# Patient Record
Sex: FEMALE | Race: WHITE | NOT HISPANIC OR LATINO | Employment: OTHER | URBAN - METROPOLITAN AREA
[De-identification: names, ages, dates, MRNs, and addresses within clinical notes are randomized per-mention and may not be internally consistent; named-entity substitution may affect disease eponyms.]

---

## 2017-12-18 ENCOUNTER — LAB (OUTPATIENT)
Dept: LAB | Facility: HOSPITAL | Age: 72
End: 2017-12-18

## 2017-12-18 ENCOUNTER — TRANSCRIBE ORDERS (OUTPATIENT)
Dept: LAB | Facility: HOSPITAL | Age: 72
End: 2017-12-18

## 2017-12-18 DIAGNOSIS — I15.9 SECONDARY HYPERTENSION: ICD-10-CM

## 2017-12-18 DIAGNOSIS — I15.9 SECONDARY HYPERTENSION: Primary | ICD-10-CM

## 2017-12-18 PROCEDURE — 84244 ASSAY OF RENIN: CPT

## 2017-12-18 PROCEDURE — 82088 ASSAY OF ALDOSTERONE: CPT

## 2017-12-18 PROCEDURE — 36415 COLL VENOUS BLD VENIPUNCTURE: CPT

## 2017-12-22 LAB — ALDOST SERPL-MCNC: 1.8 NG/DL (ref 0–30)

## 2017-12-26 LAB — RENIN PLAS-CCNC: 0.4 NG/ML/HR (ref 0.17–5.38)

## 2018-03-29 ENCOUNTER — TRANSCRIBE ORDERS (OUTPATIENT)
Dept: NUTRITION | Facility: HOSPITAL | Age: 73
End: 2018-03-29

## 2018-03-29 DIAGNOSIS — K50.10 CROHN'S DISEASE OF LARGE INTESTINE WITHOUT COMPLICATION (HCC): ICD-10-CM

## 2018-03-29 DIAGNOSIS — K59.1 FUNCTIONAL DIARRHEA: Primary | ICD-10-CM

## 2018-05-01 ENCOUNTER — HOSPITAL ENCOUNTER (OUTPATIENT)
Dept: NUTRITION | Facility: HOSPITAL | Age: 73
Setting detail: RECURRING SERIES
End: 2018-05-01

## 2018-07-12 ENCOUNTER — HOSPITAL ENCOUNTER (EMERGENCY)
Facility: HOSPITAL | Age: 73
Discharge: HOME | End: 2018-07-12
Attending: EMERGENCY MEDICINE | Admitting: EMERGENCY MEDICINE

## 2018-07-12 ENCOUNTER — APPOINTMENT (OUTPATIENT)
Dept: CT IMAGING | Facility: HOSPITAL | Age: 73
End: 2018-07-12

## 2018-07-12 ENCOUNTER — APPOINTMENT (OUTPATIENT)
Dept: GENERAL RADIOLOGY | Facility: HOSPITAL | Age: 73
End: 2018-07-12

## 2018-07-12 VITALS
HEART RATE: 77 BPM | OXYGEN SATURATION: 97 % | BODY MASS INDEX: 27.46 KG/M2 | HEIGHT: 63 IN | TEMPERATURE: 98.6 F | WEIGHT: 155 LBS | RESPIRATION RATE: 20 BRPM | DIASTOLIC BLOOD PRESSURE: 83 MMHG | SYSTOLIC BLOOD PRESSURE: 153 MMHG

## 2018-07-12 DIAGNOSIS — Z86.79 HISTORY OF HYPERTENSION: ICD-10-CM

## 2018-07-12 DIAGNOSIS — Z86.39 HISTORY OF DIABETES MELLITUS, TYPE II: ICD-10-CM

## 2018-07-12 DIAGNOSIS — R74.8 ELEVATED LIPASE: ICD-10-CM

## 2018-07-12 DIAGNOSIS — K74.60 CIRRHOSIS OF LIVER WITHOUT ASCITES, UNSPECIFIED HEPATIC CIRRHOSIS TYPE (HCC): Primary | ICD-10-CM

## 2018-07-12 LAB
ALBUMIN SERPL-MCNC: 4.25 G/DL (ref 3.2–4.8)
ALBUMIN/GLOB SERPL: 1.3 G/DL (ref 1.5–2.5)
ALP SERPL-CCNC: 50 U/L (ref 25–100)
ALT SERPL W P-5'-P-CCNC: 18 U/L (ref 7–40)
ANION GAP SERPL CALCULATED.3IONS-SCNC: 8 MMOL/L (ref 3–11)
AST SERPL-CCNC: 25 U/L (ref 0–33)
BASOPHILS # BLD AUTO: 0.01 10*3/MM3 (ref 0–0.2)
BASOPHILS NFR BLD AUTO: 0.2 % (ref 0–1)
BILIRUB SERPL-MCNC: 1.4 MG/DL (ref 0.3–1.2)
BNP SERPL-MCNC: 28 PG/ML (ref 0–100)
BUN BLD-MCNC: 14 MG/DL (ref 9–23)
BUN/CREAT SERPL: 12.4 (ref 7–25)
CALCIUM SPEC-SCNC: 9.1 MG/DL (ref 8.7–10.4)
CHLORIDE SERPL-SCNC: 108 MMOL/L (ref 99–109)
CO2 SERPL-SCNC: 20 MMOL/L (ref 20–31)
CREAT BLD-MCNC: 1.13 MG/DL (ref 0.6–1.3)
DEPRECATED RDW RBC AUTO: 40.2 FL (ref 37–54)
EOSINOPHIL # BLD AUTO: 0.11 10*3/MM3 (ref 0–0.3)
EOSINOPHIL NFR BLD AUTO: 1.8 % (ref 0–3)
ERYTHROCYTE [DISTWIDTH] IN BLOOD BY AUTOMATED COUNT: 14 % (ref 11.3–14.5)
GFR SERPL CREATININE-BSD FRML MDRD: 47 ML/MIN/1.73
GLOBULIN UR ELPH-MCNC: 3.2 GM/DL
GLUCOSE BLD-MCNC: 269 MG/DL (ref 70–100)
HCT VFR BLD AUTO: 32.1 % (ref 34.5–44)
HGB BLD-MCNC: 10.7 G/DL (ref 11.5–15.5)
HOLD SPECIMEN: NORMAL
HOLD SPECIMEN: NORMAL
IMM GRANULOCYTES # BLD: 0.01 10*3/MM3 (ref 0–0.03)
IMM GRANULOCYTES NFR BLD: 0.2 % (ref 0–0.6)
LIPASE SERPL-CCNC: 136 U/L (ref 6–51)
LYMPHOCYTES # BLD AUTO: 1.58 10*3/MM3 (ref 0.6–4.8)
LYMPHOCYTES NFR BLD AUTO: 26.1 % (ref 24–44)
MCH RBC QN AUTO: 26.5 PG (ref 27–31)
MCHC RBC AUTO-ENTMCNC: 33.3 G/DL (ref 32–36)
MCV RBC AUTO: 79.5 FL (ref 80–99)
MONOCYTES # BLD AUTO: 0.31 10*3/MM3 (ref 0–1)
MONOCYTES NFR BLD AUTO: 5.1 % (ref 0–12)
NEUTROPHILS # BLD AUTO: 4.04 10*3/MM3 (ref 1.5–8.3)
NEUTROPHILS NFR BLD AUTO: 66.8 % (ref 41–71)
PLATELET # BLD AUTO: 119 10*3/MM3 (ref 150–450)
PMV BLD AUTO: 11 FL (ref 6–12)
POTASSIUM BLD-SCNC: 3.7 MMOL/L (ref 3.5–5.5)
PROT SERPL-MCNC: 7.4 G/DL (ref 5.7–8.2)
RBC # BLD AUTO: 4.04 10*6/MM3 (ref 3.89–5.14)
SODIUM BLD-SCNC: 136 MMOL/L (ref 132–146)
TROPONIN I SERPL-MCNC: 0 NG/ML (ref 0–0.07)
WBC NRBC COR # BLD: 6.05 10*3/MM3 (ref 3.5–10.8)
WHOLE BLOOD HOLD SPECIMEN: NORMAL
WHOLE BLOOD HOLD SPECIMEN: NORMAL

## 2018-07-12 PROCEDURE — 99285 EMERGENCY DEPT VISIT HI MDM: CPT

## 2018-07-12 PROCEDURE — 83880 ASSAY OF NATRIURETIC PEPTIDE: CPT

## 2018-07-12 PROCEDURE — 83690 ASSAY OF LIPASE: CPT

## 2018-07-12 PROCEDURE — 93005 ELECTROCARDIOGRAM TRACING: CPT | Performed by: EMERGENCY MEDICINE

## 2018-07-12 PROCEDURE — 25010000002 IOPAMIDOL 61 % SOLUTION: Performed by: EMERGENCY MEDICINE

## 2018-07-12 PROCEDURE — 74177 CT ABD & PELVIS W/CONTRAST: CPT

## 2018-07-12 PROCEDURE — 96374 THER/PROPH/DIAG INJ IV PUSH: CPT

## 2018-07-12 PROCEDURE — 80053 COMPREHEN METABOLIC PANEL: CPT

## 2018-07-12 PROCEDURE — 96375 TX/PRO/DX INJ NEW DRUG ADDON: CPT

## 2018-07-12 PROCEDURE — 96376 TX/PRO/DX INJ SAME DRUG ADON: CPT

## 2018-07-12 PROCEDURE — 25010000002 ONDANSETRON PER 1 MG: Performed by: EMERGENCY MEDICINE

## 2018-07-12 PROCEDURE — 96361 HYDRATE IV INFUSION ADD-ON: CPT

## 2018-07-12 PROCEDURE — 25010000002 HYDROMORPHONE PER 4 MG: Performed by: EMERGENCY MEDICINE

## 2018-07-12 PROCEDURE — 71045 X-RAY EXAM CHEST 1 VIEW: CPT

## 2018-07-12 PROCEDURE — 84484 ASSAY OF TROPONIN QUANT: CPT

## 2018-07-12 PROCEDURE — 85025 COMPLETE CBC W/AUTO DIFF WBC: CPT

## 2018-07-12 PROCEDURE — 93005 ELECTROCARDIOGRAM TRACING: CPT

## 2018-07-12 RX ORDER — GABAPENTIN 300 MG/1
300 CAPSULE ORAL 3 TIMES DAILY
COMMUNITY

## 2018-07-12 RX ORDER — SODIUM CHLORIDE 0.9 % (FLUSH) 0.9 %
10 SYRINGE (ML) INJECTION AS NEEDED
Status: DISCONTINUED | OUTPATIENT
Start: 2018-07-12 | End: 2018-07-13 | Stop reason: HOSPADM

## 2018-07-12 RX ORDER — ONDANSETRON 4 MG/1
4 TABLET, ORALLY DISINTEGRATING ORAL EVERY 8 HOURS PRN
Qty: 14 TABLET | Refills: 0 | Status: SHIPPED | OUTPATIENT
Start: 2018-07-12 | End: 2018-11-14 | Stop reason: HOSPADM

## 2018-07-12 RX ORDER — SPIRONOLACTONE 25 MG/1
25 TABLET ORAL DAILY
COMMUNITY
End: 2019-01-17

## 2018-07-12 RX ORDER — ASPIRIN 81 MG/1
324 TABLET, CHEWABLE ORAL ONCE
Status: COMPLETED | OUTPATIENT
Start: 2018-07-12 | End: 2018-07-12

## 2018-07-12 RX ORDER — ESOMEPRAZOLE MAGNESIUM 40 MG/1
40 CAPSULE, DELAYED RELEASE ORAL DAILY
COMMUNITY
End: 2023-04-05

## 2018-07-12 RX ORDER — ONDANSETRON 2 MG/ML
4 INJECTION INTRAMUSCULAR; INTRAVENOUS ONCE
Status: COMPLETED | OUTPATIENT
Start: 2018-07-12 | End: 2018-07-12

## 2018-07-12 RX ORDER — HYDROCODONE BITARTRATE AND ACETAMINOPHEN 5; 325 MG/1; MG/1
1 TABLET ORAL EVERY 6 HOURS PRN
Qty: 12 TABLET | Refills: 0 | Status: SHIPPED | OUTPATIENT
Start: 2018-07-12 | End: 2018-08-15 | Stop reason: HOSPADM

## 2018-07-12 RX ORDER — LEVOTHYROXINE SODIUM 0.07 MG/1
88 TABLET ORAL DAILY
COMMUNITY
End: 2020-10-14

## 2018-07-12 RX ORDER — PRAMIPEXOLE DIHYDROCHLORIDE 0.5 MG/1
0.5 TABLET ORAL DAILY
COMMUNITY
End: 2018-08-15 | Stop reason: HOSPADM

## 2018-07-12 RX ORDER — POTASSIUM CHLORIDE 20 MEQ/1
20 TABLET, EXTENDED RELEASE ORAL 2 TIMES DAILY
COMMUNITY
End: 2018-11-14 | Stop reason: ALTCHOICE

## 2018-07-12 RX ADMIN — SODIUM CHLORIDE 1000 ML: 9 INJECTION, SOLUTION INTRAVENOUS at 17:39

## 2018-07-12 RX ADMIN — ONDANSETRON 4 MG: 2 INJECTION, SOLUTION INTRAMUSCULAR; INTRAVENOUS at 17:40

## 2018-07-12 RX ADMIN — HYDROMORPHONE HYDROCHLORIDE 0.5 MG: 1 INJECTION, SOLUTION INTRAMUSCULAR; INTRAVENOUS; SUBCUTANEOUS at 22:18

## 2018-07-12 RX ADMIN — ASPIRIN 81 MG 324 MG: 81 TABLET ORAL at 17:36

## 2018-07-12 RX ADMIN — IOPAMIDOL 95 ML: 612 INJECTION, SOLUTION INTRAVENOUS at 19:11

## 2018-07-12 NOTE — ED PROVIDER NOTES
Subjective   Yenny Atkins is a 72 y.o.female who presents to the ED with complaints of worsening chest pain for the past ten days. The patient complains of central chest pain, which radiates into the epigastric region and back. She states the pain comes and goes, but has been worsening. She also states the pain is worsened by food intake, and so, she has not been eating during the past ten days. However, she notes a normal intake of fluids. She also complains of associated nausea and generalized weakness. Ms. Atkins denies any vomiting, shortness of breath, or any other acute complaints at this time. The patient states that she went to the [facility] on 7/6 for the same presenting symptoms. She had blood work done, which showed that her kidney, pancreas, and blood sugar levels were elevated. She also had a CT of her abdomen and pelvis with contrast, which showed no explanation for her current symptoms. The patient reports she was prescribed Zantac and was referred to a GI doctor for further evaluation. The patient has past medical history of diabetes mellitus. She has past surgical history of colon resection.        History provided by:  Patient  Chest Pain   Chest pain location: central.  Pain radiates to:  Epigastrium (back)  Pain severity:  Moderate  Onset quality:  Gradual  Duration:  10 days  Timing:  Intermittent  Progression:  Worsening  Chronicity:  New  Context: eating    Relieved by:  None tried  Exacerbated by: eating.  Ineffective treatments: Zantac.  Associated symptoms: abdominal pain (epigastric pain), back pain (secondary to chest pain), nausea and weakness (generalized)    Associated symptoms: no shortness of breath and no vomiting    Risk factors: diabetes mellitus        Review of Systems   Constitutional: Positive for appetite change (decreased food intake).   Respiratory: Negative for shortness of breath.    Cardiovascular: Positive for chest pain.   Gastrointestinal: Positive for  abdominal pain (epigastric pain) and nausea. Negative for vomiting.   Musculoskeletal: Positive for back pain (secondary to chest pain).   Neurological: Positive for weakness (generalized).   All other systems reviewed and are negative.      Past Medical History:   Diagnosis Date   • Hyperglycemia    • Hypertension    • Hypothyroidism    • Restless legs        Allergies   Allergen Reactions   • Phenergan [Promethazine Hcl] Mental Status Change       Past Surgical History:   Procedure Laterality Date   • COLON RESECTION     • KNEE SURGERY Right     TWICE       History reviewed. No pertinent family history.    Social History     Social History   • Marital status:      Social History Main Topics   • Smoking status: Never Smoker   • Alcohol use No   • Drug use: No   • Sexual activity: Defer     Other Topics Concern   • Not on file         Objective   Physical Exam   Constitutional: She is oriented to person, place, and time. She appears well-developed and well-nourished. No distress.   HENT:   Head: Normocephalic and atraumatic.   Eyes: Conjunctivae are normal. No scleral icterus.   Neck: Normal range of motion. Neck supple.   Cardiovascular: Normal rate, regular rhythm and normal heart sounds.    No murmur heard.  Pulmonary/Chest: Effort normal and breath sounds normal. No respiratory distress.   Abdominal: Soft. Bowel sounds are normal. There is tenderness. There is no guarding.   Mild tenderness in the epigastrium   Musculoskeletal: Normal range of motion. She exhibits no tenderness.   Neurological: She is alert and oriented to person, place, and time.   Skin: Skin is warm and dry. She is not diaphoretic.   Psychiatric: She has a normal mood and affect. Her behavior is normal.   Nursing note and vitals reviewed.      Procedures         ED Course  Recent Results (from the past 24 hour(s))   Comprehensive Metabolic Panel    Collection Time: 07/12/18  3:03 PM   Result Value Ref Range    Glucose 269 (H) 70 - 100  mg/dL    BUN 14 9 - 23 mg/dL    Creatinine 1.13 0.60 - 1.30 mg/dL    Sodium 136 132 - 146 mmol/L    Potassium 3.7 3.5 - 5.5 mmol/L    Chloride 108 99 - 109 mmol/L    CO2 20.0 20.0 - 31.0 mmol/L    Calcium 9.1 8.7 - 10.4 mg/dL    Total Protein 7.4 5.7 - 8.2 g/dL    Albumin 4.25 3.20 - 4.80 g/dL    ALT (SGPT) 18 7 - 40 U/L    AST (SGOT) 25 0 - 33 U/L    Alkaline Phosphatase 50 25 - 100 U/L    Total Bilirubin 1.4 (H) 0.3 - 1.2 mg/dL    eGFR Non African Amer 47 (L) >60 mL/min/1.73    Globulin 3.2 gm/dL    A/G Ratio 1.3 (L) 1.5 - 2.5 g/dL    BUN/Creatinine Ratio 12.4 7.0 - 25.0    Anion Gap 8.0 3.0 - 11.0 mmol/L   Lipase    Collection Time: 07/12/18  3:03 PM   Result Value Ref Range    Lipase 136 (H) 6 - 51 U/L   BNP    Collection Time: 07/12/18  3:03 PM   Result Value Ref Range    BNP 28.0 0.0 - 100.0 pg/mL   Light Blue Top    Collection Time: 07/12/18  3:03 PM   Result Value Ref Range    Extra Tube hold for add-on    Green Top (Gel)    Collection Time: 07/12/18  3:03 PM   Result Value Ref Range    Extra Tube Hold for add-ons.    Lavender Top    Collection Time: 07/12/18  3:03 PM   Result Value Ref Range    Extra Tube hold for add-on    Gold Top - SST    Collection Time: 07/12/18  3:03 PM   Result Value Ref Range    Extra Tube Hold for add-ons.    CBC Auto Differential    Collection Time: 07/12/18  3:03 PM   Result Value Ref Range    WBC 6.05 3.50 - 10.80 10*3/mm3    RBC 4.04 3.89 - 5.14 10*6/mm3    Hemoglobin 10.7 (L) 11.5 - 15.5 g/dL    Hematocrit 32.1 (L) 34.5 - 44.0 %    MCV 79.5 (L) 80.0 - 99.0 fL    MCH 26.5 (L) 27.0 - 31.0 pg    MCHC 33.3 32.0 - 36.0 g/dL    RDW 14.0 11.3 - 14.5 %    RDW-SD 40.2 37.0 - 54.0 fl    MPV 11.0 6.0 - 12.0 fL    Platelets 119 (L) 150 - 450 10*3/mm3    Neutrophil % 66.8 41.0 - 71.0 %    Lymphocyte % 26.1 24.0 - 44.0 %    Monocyte % 5.1 0.0 - 12.0 %    Eosinophil % 1.8 0.0 - 3.0 %    Basophil % 0.2 0.0 - 1.0 %    Immature Grans % 0.2 0.0 - 0.6 %    Neutrophils, Absolute 4.04 1.50 - 8.30  10*3/mm3    Lymphocytes, Absolute 1.58 0.60 - 4.80 10*3/mm3    Monocytes, Absolute 0.31 0.00 - 1.00 10*3/mm3    Eosinophils, Absolute 0.11 0.00 - 0.30 10*3/mm3    Basophils, Absolute 0.01 0.00 - 0.20 10*3/mm3    Immature Grans, Absolute 0.01 0.00 - 0.03 10*3/mm3   POC Troponin, Rapid    Collection Time: 07/12/18  3:03 PM   Result Value Ref Range    Troponin I 0.00 0.00 - 0.07 ng/mL     Note: In addition to lab results from this visit, the labs listed above may include labs taken at another facility or during a different encounter within the last 24 hours. Please correlate lab times with ED admission and discharge times for further clarification of the services performed during this visit.    CT Abdomen Pelvis With Contrast   Final Result   1. There is cirrhosis and portal venous hypertension. No ascites.   2. The colon is notable for a lack of formed stool. Rather, it contains only    liquid stool and air. There is no colitis.   3. There is a 1 cm hypodensity within the right renal lower pole cortex which    is well defined but too small to characterize in terms of attenuation.    Therefore, based on this study is indeterminate. If further imaging is desired,    ultrasound should be able to characterize this as solid or cystic.      THIS DOCUMENT HAS BEEN ELECTRONICALLY SIGNED BY DAVIDA MOCK MD      XR Chest 1 View   Final Result   Low lung volumes with hypoventilatory findings including   bibasilar atelectasis and vascular crowding. Blunting of the left   lateral costophrenic sulcus may represent atelectasis and/or airspace   disease in the appropriate clinical setting. Borderline cardiomegaly   without significant effusion.       D:  07/12/2018   E:  07/12/2018           This report was finalized on 7/12/2018 4:19 PM by Dr. Thad Cage.            Vitals:    07/12/18 1723 07/12/18 1855 07/12/18 2002 07/12/18 2123   BP: 139/75 139/81 142/90 139/62   BP Location: Left arm Left arm Left arm Left arm   Patient  Position: Lying Lying Lying Lying   Pulse: 75  75 76   Resp: 16 16 18 16   Temp:       TempSrc:       SpO2: 98% 96% 98% 98%   Weight:       Height:         Medications   sodium chloride 0.9 % flush 10 mL (not administered)   HYDROmorphone (DILAUDID) injection 0.5 mg (0.5 mg Intravenous Not Given 7/12/18 1743)   aspirin chewable tablet 324 mg (324 mg Oral Given 7/12/18 1736)   sodium chloride 0.9 % bolus 1,000 mL (0 mL Intravenous Stopped 7/12/18 1850)   ondansetron (ZOFRAN) injection 4 mg (4 mg Intravenous Given 7/12/18 1740)   iopamidol (ISOVUE-300) 61 % injection 100 mL (95 mL Intravenous Given 7/12/18 1911)     ECG/EMG Results (last 24 hours)     Procedure Component Value Units Date/Time    ECG 12 Lead [[id number]] Collected:  07/12/18 1506     Updated:  07/12/18 1507          ED Course as of Jul 12 2145   Thu Jul 12, 2018   1629 Glucose: (!) 269 [ML]   1629 Lipase: (!) 136 [ML]   1650 Total Bilirubin: (!) 1.4 [ML]      ED Course User Index  [ML] EARNEST Paul                     [facility]    Final diagnoses:   Cirrhosis of liver without ascites, unspecified hepatic cirrhosis type (CMS/HCC)   Elevated lipase   History of hypertension   History of diabetes mellitus, type II       Documentation assistance provided by erin Cano.  Information recorded by the gtibmarc was done at my direction and has been verified and validated by me.     Maged Cano  07/12/18 1715       EARNEST Paul  07/12/18 2145

## 2018-07-13 NOTE — DISCHARGE INSTRUCTIONS
Keep your anticipated appointment with your GI physician;  YOU ARE ELIGIBLE FOR  a referral to Dr. Morales and Giacomo's office.  You may call them tomorrow for follow up appointment.   [place] diet.  Plenty of fluids.  Medications as directed for symptom control.  Thank you

## 2018-07-16 ENCOUNTER — HOSPITAL ENCOUNTER (OUTPATIENT)
Dept: NUTRITION | Facility: HOSPITAL | Age: 73
Setting detail: RECURRING SERIES
Discharge: HOME | End: 2018-07-16
Attending: INTERNAL MEDICINE

## 2018-07-16 VITALS — BODY MASS INDEX: 27.64 KG/M2 | WEIGHT: 156 LBS | HEIGHT: 63 IN

## 2018-07-16 PROCEDURE — 97802 MEDICAL NUTRITION INDIV IN: CPT | Performed by: DIETITIAN, REGISTERED

## 2018-08-03 ENCOUNTER — TELEPHONE (OUTPATIENT)
Dept: NUTRITION | Facility: HOSPITAL | Age: 73
End: 2018-08-03

## 2018-08-03 NOTE — PROGRESS NOTES
"Adult Outpatient Nutrition  Assessment    Patient Name:  Yenny Atkins  YOB: 1945  MRN: [id number]    Assessment Date:  8/3/2018    Comments:  Patient was seen on 7/16/18 for nutrition counseling appointment. Patient cancelled 8/6/18 follow up visit due to too many upcoming appointments. Spoke with patient on the phone. Patient reports more intentional weight loss, current weight of 67.5 kg (149 lbs). Weight loss of 3 kg in a month. Patient reports cutting back on food portion sizes and \"watching what I eat\". She is not following the low FODMAP diet or counting carbohydrate because it's \"too much\" for her. Not keeping a food log. She still avoids soda, even reduced artificial sweetener intake to once a week (was drinking Crystal Light daily with 3-4 packs of Sweet n' Low). She no longer fries meats, only baking in the oven. Patient reports improvement in fasting blood sugar from 180's to 130's.     Rescheduled follow up visit for 8/20/18 at 10:30 am. Thank you for the referral.       Electronically signed by:  Mena Haro RD  08/03/18 11:36 AM   "

## 2018-08-06 ENCOUNTER — APPOINTMENT (OUTPATIENT)
Dept: NUTRITION | Facility: HOSPITAL | Age: 73
End: 2018-08-06
Attending: INTERNAL MEDICINE

## 2018-08-15 ENCOUNTER — APPOINTMENT (OUTPATIENT)
Dept: LAB | Facility: HOSPITAL | Age: 73
End: 2018-08-15

## 2018-08-15 ENCOUNTER — OFFICE VISIT (OUTPATIENT)
Dept: GASTROENTEROLOGY | Facility: CLINIC | Age: 73
End: 2018-08-15

## 2018-08-15 VITALS
HEIGHT: 63 IN | HEART RATE: 93 BPM | BODY MASS INDEX: 26.22 KG/M2 | OXYGEN SATURATION: 99 % | WEIGHT: 148 LBS | DIASTOLIC BLOOD PRESSURE: 60 MMHG | SYSTOLIC BLOOD PRESSURE: 112 MMHG | TEMPERATURE: 97 F

## 2018-08-15 DIAGNOSIS — K74.69 OTHER CIRRHOSIS OF LIVER (HCC): ICD-10-CM

## 2018-08-15 DIAGNOSIS — K75.81 NASH (NONALCOHOLIC STEATOHEPATITIS): Primary | ICD-10-CM

## 2018-08-15 DIAGNOSIS — K76.82 HEPATIC ENCEPHALOPATHY (HCC): ICD-10-CM

## 2018-08-15 DIAGNOSIS — K76.82 HEPATIC ENCEPHALOPATHY (HCC): Primary | ICD-10-CM

## 2018-08-15 LAB
HAV IGM SERPL QL IA: NORMAL
HBV CORE IGM SERPL QL IA: NORMAL
HBV SURFACE AG SERPL QL IA: NORMAL
HCV AB SER DONR QL: NORMAL

## 2018-08-15 PROCEDURE — 99204 OFFICE O/P NEW MOD 45 MIN: CPT | Performed by: INTERNAL MEDICINE

## 2018-08-15 PROCEDURE — 36415 COLL VENOUS BLD VENIPUNCTURE: CPT | Performed by: INTERNAL MEDICINE

## 2018-08-15 PROCEDURE — 82105 ALPHA-FETOPROTEIN SERUM: CPT | Performed by: INTERNAL MEDICINE

## 2018-08-15 PROCEDURE — 80074 ACUTE HEPATITIS PANEL: CPT | Performed by: INTERNAL MEDICINE

## 2018-08-15 RX ORDER — PSYLLIUM HUSK 3.4 G/7G
2 POWDER ORAL DAILY
Refills: 1 | COMMUNITY
Start: 2018-08-13 | End: 2019-08-14

## 2018-08-15 RX ORDER — DICYCLOMINE HYDROCHLORIDE 10 MG/1
CAPSULE ORAL
COMMUNITY
End: 2018-11-14 | Stop reason: HOSPADM

## 2018-08-15 RX ORDER — MELATONIN
DAILY
COMMUNITY
End: 2019-08-14

## 2018-08-15 NOTE — PROGRESS NOTES
PCP: Taina Mckoy MD    Chief Complaint   Patient presents with   • Abdominal Pain       History of Present Illness:   HPI  Ms. Atkins is a 72 yo with a history of hypertension, hypothyroidism and hyperglycemia.  The patient was recently diagnosed with cirrhosis based on the CT scan.  She admits that years ago a diagnosis of fatty liver was made.  The patient states her mother had cirrhosis of the liver and believes it was due to fatty changes.  She is unaware of any personal history of elevated liver tests.  The patient had a colon resection almost 30 years ago and was diagnosed with Crohn's disease.  She has not been on any medication post operatively for Crohn's disease.  Ms. Atkins has been followed over the last couple of years by Dr. Narvaez.  Dr. Narvaez performed an upper endoscopy last year and she states this was normal.  The results were not available at the time of the consultation.  The patient admits to some issues with fatigue.  She denies any signs of blood in the stool.  She does have multiple bowel movements on a daily basis but denies nocturnal diarrhea.  She has undergone previous colonoscopy and apparently is scheduled for another examination by Dr. Narvaez.  Ms. Atkins states she has some issues with memory and thinking clearly at times.  There is no history of alcohol use.  The patient is unaware of any exposure to hepatitis A, B, and C. She does not recall being vaccinated for hepatitis A or B.  Past Medical History:   Diagnosis Date   • Cirrhosis (CMS/HCC)     liver   • Hyperglycemia    • Hypertension    • Hypothyroidism    • Restless legs        Past Surgical History:   Procedure Laterality Date   • COLON RESECTION     • COLONOSCOPY  2017    dr kendy narvaez- [facility]   • KNEE SURGERY Right     TWICE   • UPPER GASTROINTESTINAL ENDOSCOPY           Current Outpatient Prescriptions:   •  cholecalciferol (VITAMIN D3) 1000 units tablet, Daily., Disp: , Rfl:   •  dicyclomine (BENTYL)  10 MG capsule, dicyclomine 10 mg tablet  Take by oral route., Disp: , Rfl:   •  Esomeprazole Magnesium (NEXIUM PO), Take 40 mg by mouth Daily., Disp: , Rfl:   •  gabapentin (NEURONTIN) 300 MG capsule, Take 300 mg by mouth 3 (Three) Times a Day., Disp: , Rfl:   •  levothyroxine (SYNTHROID, LEVOTHROID) 75 MCG tablet, Take 75 mcg by mouth Daily., Disp: , Rfl:   •  metFORMIN (GLUCOPHAGE) 500 MG tablet, Take 1,000 mg by mouth Daily With Breakfast., Disp: , Rfl:   •  ondansetron ODT (ZOFRAN-ODT) 4 MG disintegrating tablet, Take 1 tablet by mouth Every 8 (Eight) Hours As Needed for Nausea or Vomiting., Disp: 14 tablet, Rfl: 0  •  Pancrelipase, Lip-Prot-Amyl, (CREON) 41406 units capsule delayed-release particles, Every 8 (Eight) Hours., Disp: , Rfl:   •  potassium chloride (K-DUR,KLOR-CON) 20 MEQ CR tablet, Take 20 mEq by mouth 2 (Two) Times a Day., Disp: , Rfl:   •  RA VITAMIN B-12 TR 1000 MCG tablet controlled-release, Take 2 tablets by mouth Daily., Disp: , Rfl: 1  •  spironolactone (ALDACTONE) 25 MG tablet, Take 25 mg by mouth Daily., Disp: , Rfl:     Allergies   Allergen Reactions   • Phenergan [Promethazine Hcl] Mental Status Change   • Tylenol [Acetaminophen] Other (See Comments)     Feels crazy when taking tylenol       Family History   Problem Relation Age of Onset   • Colon cancer Father    • Colon cancer Paternal Grandfather    • Esophageal cancer Son        Social History     Social History   • Marital status:      Spouse name: N/A   • Number of children: N/A   • Years of education: N/A     Occupational History   • Not on file.     Social History Main Topics   • Smoking status: Never Smoker   • Smokeless tobacco: Not on file   • Alcohol use No   • Drug use: No   • Sexual activity: Defer     Other Topics Concern   • Not on file     Social History Narrative   • No narrative on file       Review of Systems   Constitutional: Positive for appetite change, fatigue and unexpected weight change (loss). Negative  for activity change and fever.   HENT: Positive for sore throat (after drinking). Negative for dental problem, hearing loss, mouth sores, postnasal drip, sneezing, trouble swallowing and voice change.    Eyes: Negative for pain, redness, itching and visual disturbance.   Respiratory: Negative for cough, choking, chest tightness, shortness of breath and wheezing.    Cardiovascular: Positive for chest pain. Negative for palpitations and leg swelling.   Gastrointestinal: Positive for abdominal distention (bloating) and diarrhea. Negative for abdominal pain, anal bleeding, blood in stool, constipation, nausea, rectal pain and vomiting.        Heartburn   Endocrine: Negative for cold intolerance, heat intolerance, polydipsia, polyphagia and polyuria.   Genitourinary: Negative.  Negative for dysuria, enuresis, flank pain, hematuria and urgency.   Musculoskeletal: Positive for gait problem. Negative for arthralgias, back pain, joint swelling and myalgias.   Skin: Negative for color change, pallor and rash.   Allergic/Immunologic: Negative for environmental allergies, food allergies and immunocompromised state.   Neurological: Positive for dizziness and speech difficulty. Negative for tremors, seizures, facial asymmetry, numbness and headaches.   Hematological: Negative for adenopathy.   Psychiatric/Behavioral: Positive for confusion. Negative for behavioral problems, dysphoric mood, hallucinations and self-injury.       Vitals:    08/15/18 1101   BP: 112/60   Pulse: 93   Temp: 97 °F (36.1 °C)   SpO2: 99%       Physical Exam   Constitutional: She appears well-nourished. No distress.   HENT:   Head: Atraumatic.   Mouth/Throat: Oropharynx is clear and moist. No oropharyngeal exudate.   Eyes: EOM are normal. No scleral icterus.   Neck: Neck supple. No thyromegaly present.   Cardiovascular: Normal rate, regular rhythm and normal heart sounds.  Exam reveals no gallop.    No murmur heard.  Pulmonary/Chest: Effort normal and  breath sounds normal. She has no wheezes. She has no rales.   Abdominal: Soft. Bowel sounds are normal. There is no tenderness. There is no rebound and no guarding.   No ascites   Musculoskeletal: She exhibits no edema or tenderness.   Lymphadenopathy:     She has no cervical adenopathy.   Neurological: She is alert. She exhibits normal muscle tone.   No asterixis   Skin: Skin is dry. No rash noted.   No spider nevi   Psychiatric: She has a normal mood and affect. Her behavior is normal. Thought content normal.   Vitals reviewed.      Yenny was seen today for abdominal pain.    Diagnoses and all orders for this visit:    CASE (nonalcoholic steatohepatitis)  -     Hepatitis Panel, Acute  -     AFP Tumor Marker    Other cirrhosis of liver (CMS/HCC)  -     Hepatitis Panel, Acute  -     AFP Tumor Marker    Hepatic encephalopathy (CMS/HCC)    The CT scan was suggestive of cirrhosis.  The etiology is likely Case.  She reports an unremarkable upper endoscopy performed by Dr. Narvaez in 2017.  There is no ascites on CT scan.  The clinical history suggests she may have some hepatic encephalopathy.      Plan: Will check acute hepatitis panel.           Will check alpha-fetoprotein.           Will ask for endoscopic records from Dr. Narvaez office.           Will start Xifaxan for hepatic encephalopathy.           Follow-up in the office in 3 months.    I spent over 50% of the office visit counseling and answering questions from the patient.

## 2018-08-16 ENCOUNTER — TELEPHONE (OUTPATIENT)
Dept: GASTROENTEROLOGY | Facility: CLINIC | Age: 73
End: 2018-08-16

## 2018-08-16 LAB — AFP-TM SERPL-MCNC: 4.3 NG/ML (ref 0–8.3)

## 2018-08-16 NOTE — TELEPHONE ENCOUNTER
Called patient. Talked to patient' and daughter in law. Gave her lab results. Also explained why Dr Gooden recommend HEP A and HEP B.

## 2018-08-16 NOTE — TELEPHONE ENCOUNTER
----- Message from Harry Gooden MD sent at 8/16/2018  8:56 AM EDT -----  Let Mrs. Atkins know she does not have antibody protection for hepatitis A and B.  I would recommend vaccination for both hepatitis A and B.  This could be done at her primary care office or can be scheduled through our office.  Thank you,  FATIMAH

## 2018-08-20 ENCOUNTER — HOSPITAL ENCOUNTER (OUTPATIENT)
Dept: NUTRITION | Facility: HOSPITAL | Age: 73
Setting detail: RECURRING SERIES
End: 2018-08-20
Attending: INTERNAL MEDICINE

## 2018-08-27 ENCOUNTER — CLINICAL SUPPORT (OUTPATIENT)
Dept: GASTROENTEROLOGY | Facility: CLINIC | Age: 73
End: 2018-08-27

## 2018-08-27 DIAGNOSIS — Z23 ENCOUNTER FOR IMMUNIZATION: ICD-10-CM

## 2018-08-27 DIAGNOSIS — Z29.9 ENCOUNTER FOR PREVENTIVE MEASURE: Primary | ICD-10-CM

## 2018-08-27 PROCEDURE — 90746 HEPB VACCINE 3 DOSE ADULT IM: CPT | Performed by: NURSE PRACTITIONER

## 2018-08-27 PROCEDURE — 90471 IMMUNIZATION ADMIN: CPT | Performed by: INTERNAL MEDICINE

## 2018-08-27 PROCEDURE — 90632 HEPA VACCINE ADULT IM: CPT | Performed by: NURSE PRACTITIONER

## 2018-08-27 PROCEDURE — 90472 IMMUNIZATION ADMIN EACH ADD: CPT | Performed by: INTERNAL MEDICINE

## 2018-09-27 ENCOUNTER — CLINICAL SUPPORT (OUTPATIENT)
Dept: GASTROENTEROLOGY | Facility: CLINIC | Age: 73
End: 2018-09-27

## 2018-09-27 DIAGNOSIS — Z29.9 ENCOUNTER FOR PREVENTIVE MEASURE: Primary | ICD-10-CM

## 2018-09-27 DIAGNOSIS — Z23 ENCOUNTER FOR IMMUNIZATION: ICD-10-CM

## 2018-09-27 PROCEDURE — 90471 IMMUNIZATION ADMIN: CPT | Performed by: INTERNAL MEDICINE

## 2018-09-27 PROCEDURE — 90746 HEPB VACCINE 3 DOSE ADULT IM: CPT | Performed by: INTERNAL MEDICINE

## 2018-11-14 ENCOUNTER — APPOINTMENT (OUTPATIENT)
Dept: LAB | Facility: HOSPITAL | Age: 73
End: 2018-11-14

## 2018-11-14 ENCOUNTER — OFFICE VISIT (OUTPATIENT)
Dept: GASTROENTEROLOGY | Facility: CLINIC | Age: 73
End: 2018-11-14

## 2018-11-14 VITALS
SYSTOLIC BLOOD PRESSURE: 142 MMHG | BODY MASS INDEX: 28.6 KG/M2 | TEMPERATURE: 97.2 F | DIASTOLIC BLOOD PRESSURE: 68 MMHG | HEART RATE: 77 BPM | HEIGHT: 63 IN | WEIGHT: 161.4 LBS | OXYGEN SATURATION: 98 %

## 2018-11-14 DIAGNOSIS — K75.81 NASH (NONALCOHOLIC STEATOHEPATITIS): Primary | ICD-10-CM

## 2018-11-14 DIAGNOSIS — R19.7 DIARRHEA, UNSPECIFIED TYPE: ICD-10-CM

## 2018-11-14 DIAGNOSIS — K74.69 OTHER CIRRHOSIS OF LIVER (HCC): ICD-10-CM

## 2018-11-14 LAB
ALBUMIN SERPL-MCNC: 4.14 G/DL (ref 3.2–4.8)
ALBUMIN/GLOB SERPL: 1.7 G/DL (ref 1.5–2.5)
ALP SERPL-CCNC: 47 U/L (ref 25–100)
ALT SERPL W P-5'-P-CCNC: 14 U/L (ref 7–40)
ANION GAP SERPL CALCULATED.3IONS-SCNC: 7 MMOL/L (ref 3–11)
AST SERPL-CCNC: 17 U/L (ref 0–33)
BILIRUB SERPL-MCNC: 0.9 MG/DL (ref 0.3–1.2)
BUN BLD-MCNC: 22 MG/DL (ref 9–23)
BUN/CREAT SERPL: 17.3 (ref 7–25)
CALCIUM SPEC-SCNC: 8.8 MG/DL (ref 8.7–10.4)
CHLORIDE SERPL-SCNC: 108 MMOL/L (ref 99–109)
CO2 SERPL-SCNC: 24 MMOL/L (ref 20–31)
CREAT BLD-MCNC: 1.27 MG/DL (ref 0.6–1.3)
GFR SERPL CREATININE-BSD FRML MDRD: 41 ML/MIN/1.73
GLOBULIN UR ELPH-MCNC: 2.5 GM/DL
GLUCOSE BLD-MCNC: 115 MG/DL (ref 70–100)
INR PPP: 1.02 (ref 0.91–1.09)
POTASSIUM BLD-SCNC: 3.6 MMOL/L (ref 3.5–5.5)
PROT SERPL-MCNC: 6.6 G/DL (ref 5.7–8.2)
PROTHROMBIN TIME: 10.7 SECONDS (ref 9.6–11.5)
SODIUM BLD-SCNC: 139 MMOL/L (ref 132–146)

## 2018-11-14 PROCEDURE — 80053 COMPREHEN METABOLIC PANEL: CPT | Performed by: INTERNAL MEDICINE

## 2018-11-14 PROCEDURE — 99214 OFFICE O/P EST MOD 30 MIN: CPT | Performed by: INTERNAL MEDICINE

## 2018-11-14 PROCEDURE — 85610 PROTHROMBIN TIME: CPT | Performed by: INTERNAL MEDICINE

## 2018-11-14 PROCEDURE — 36415 COLL VENOUS BLD VENIPUNCTURE: CPT | Performed by: INTERNAL MEDICINE

## 2018-11-14 RX ORDER — GLIMEPIRIDE 1 MG/1
2 TABLET ORAL 2 TIMES DAILY
COMMUNITY
Start: 2018-11-01 | End: 2020-10-31

## 2018-11-14 RX ORDER — OLMESARTAN MEDOXOMIL 20 MG/1
TABLET ORAL DAILY
COMMUNITY
End: 2019-01-17

## 2018-11-14 RX ORDER — PRAMIPEXOLE DIHYDROCHLORIDE 0.5 MG/1
0.5 TABLET ORAL DAILY
COMMUNITY

## 2018-11-14 NOTE — PROGRESS NOTES
PCP: Taina Mckoy MD    Chief Complaint   Patient presents with   • Follow-up       History of Present Illness:   HPI  Mrs. Atkins returns to the office for a follow-up visit.  She apparently went to the [facility] for evaluation with regards to the diagnosis of cirrhosis.  The patient does not recall any new issues being addressed other than the medications metformin and losartan was stopped.  She was not able to afford the Xifaxan medication for encephalopathy.  She denies any abdominal pain with meals or nausea.  She does experience some loose stool multiple times a day.  There is no history of nocturnal diarrhea.  She denies any signs of gastrointestinal bleeding.  Past Medical History:   Diagnosis Date   • Cirrhosis (CMS/HCC)     liver   • Hyperglycemia    • Hypertension    • Hypothyroidism    • Restless legs        Past Surgical History:   Procedure Laterality Date   • COLON RESECTION     [facility]   • KNEE SURGERY Right     TWICE   • UPPER GASTROINTESTINAL ENDOSCOPY           Current Outpatient Medications:   •  cholecalciferol (VITAMIN D3) 1000 units tablet, Daily., Disp: , Rfl:   •  Esomeprazole Magnesium (NEXIUM PO), Take 40 mg by mouth Daily., Disp: , Rfl:   •  gabapentin (NEURONTIN) 300 MG capsule, Take 300 mg by mouth 3 (Three) Times a Day., Disp: , Rfl:   •  glimepiride (AMARYL) 1 MG tablet, Take  by mouth Daily., Disp: , Rfl:   •  levothyroxine (SYNTHROID, LEVOTHROID) 75 MCG tablet, Take 75 mcg by mouth Daily., Disp: , Rfl:   •  olmesartan (BENICAR) 20 MG tablet, Daily., Disp: , Rfl:   •  pramipexole (MIRAPEX) 0.5 MG tablet, Take 0.5 mg by mouth Daily., Disp: , Rfl:   •  RA VITAMIN B-12 TR 1000 MCG tablet controlled-release, Take 2 tablets by mouth Daily., Disp: , Rfl: 1  •  spironolactone (ALDACTONE) 25 MG tablet, Take 25 mg by mouth Daily., Disp: , Rfl:     Allergies   Allergen Reactions   • Phenergan [Promethazine Hcl] Mental Status Change   •  Tylenol [Acetaminophen] Other (See Comments)     Feels crazy when taking tylenol       Family History   Problem Relation Age of Onset   • Colon cancer Father    • Colon cancer Paternal Grandfather    • Esophageal cancer Son        Social History     Socioeconomic History   • Marital status:      Spouse name: Not on file   • Number of children: Not on file   • Years of education: Not on file   • Highest education level: Not on file   Social Needs   • Financial resource strain: Not on file   • Food insecurity - worry: Not on file   • Food insecurity - inability: Not on file   • Transportation needs - medical: Not on file   • Transportation needs - non-medical: Not on file   Occupational History   • Not on file   Tobacco Use   • Smoking status: Never Smoker   Substance and Sexual Activity   • Alcohol use: No   • Drug use: No   • Sexual activity: Defer   Other Topics Concern   • Not on file   Social History Narrative   • Not on file       Review of Systems   Constitutional: Positive for fatigue and unexpected weight change (gain). Negative for activity change, appetite change and fever.   HENT: Negative for dental problem, hearing loss, mouth sores, postnasal drip, sneezing, trouble swallowing and voice change.    Eyes: Negative for pain, redness, itching and visual disturbance.   Respiratory: Positive for cough and choking. Negative for chest tightness, shortness of breath and wheezing.    Cardiovascular: Negative for chest pain, palpitations and leg swelling.   Gastrointestinal: Positive for abdominal distention (bloating) and diarrhea. Negative for abdominal pain, anal bleeding, blood in stool, constipation, nausea, rectal pain and vomiting.   Endocrine: Negative for cold intolerance, heat intolerance, polydipsia, polyphagia and polyuria.   Genitourinary: Negative.  Negative for dysuria, enuresis, flank pain, hematuria and urgency.   Musculoskeletal: Negative for arthralgias, back pain, gait problem, joint  swelling and myalgias.   Skin: Negative for color change, pallor and rash.   Allergic/Immunologic: Negative for environmental allergies, food allergies and immunocompromised state.   Neurological: Negative for dizziness, tremors, seizures, facial asymmetry, speech difficulty, numbness and headaches.   Hematological: Negative for adenopathy.   Psychiatric/Behavioral: Negative for behavioral problems, confusion, dysphoric mood, hallucinations and self-injury.       Vitals:    11/14/18 1341   BP: 142/68   Pulse: 77   Temp: 97.2 °F (36.2 °C)   SpO2: 98%       Physical Exam   Constitutional: She is oriented to person, place, and time. She appears well-nourished. No distress.   HENT:   Head: Atraumatic.   Mouth/Throat: Oropharynx is clear and moist. No oropharyngeal exudate.   Eyes: EOM are normal. No scleral icterus.   Neck: Neck supple. No thyromegaly present.   Cardiovascular: Normal rate, regular rhythm and normal heart sounds. Exam reveals no gallop.   No murmur heard.  Pulmonary/Chest: Effort normal and breath sounds normal. She has no wheezes. She has no rales.   Abdominal: Soft. Bowel sounds are normal. There is no tenderness. There is no rebound and no guarding.   Musculoskeletal: Normal range of motion. She exhibits no edema.   Lymphadenopathy:     She has no cervical adenopathy.   Neurological: She is alert and oriented to person, place, and time. She exhibits normal muscle tone.   No asterixis   Skin: Skin is dry. No erythema.   Psychiatric: She has a normal mood and affect. Her behavior is normal. Thought content normal.   Vitals reviewed.      Yenny was seen today for follow-up.    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  There is no history that is suggestive of hepatic decompensation at this time.  The patient complains of some issues with diarrhea.  She has undergone previous evaluation by Dr. Narvaez and no etiology was found per report.        Plan: Will check CMP and pro time.           Schedule ultrasound for HCC screening.           Discussed that lactulose would not be a good option for encephalopathy given this medication will cause more issues with diarrhea.           Will check fecal elastase and C. Difficile.           Will follow-up in 3 months.    I spent over 50% of the office visit counseling and answering questions from the patient.

## 2018-11-15 ENCOUNTER — TELEPHONE (OUTPATIENT)
Dept: GASTROENTEROLOGY | Facility: CLINIC | Age: 73
End: 2018-11-15

## 2018-11-15 NOTE — TELEPHONE ENCOUNTER
----- Message from Harry Gooden MD sent at 11/14/2018  4:43 PM EST -----  Let Ms. Atkins know  the liver tests were normal.  Thank you,  FATIMAH

## 2018-11-16 NOTE — TELEPHONE ENCOUNTER
Gave patient the message, patient gave verbal understanding. Patient would like to know if you can get her report from [facility]? Patient would like to know if that means she does not have cirrhosis?

## 2018-11-21 ENCOUNTER — TELEPHONE (OUTPATIENT)
Dept: GASTROENTEROLOGY | Facility: CLINIC | Age: 73
End: 2018-11-21

## 2018-11-26 ENCOUNTER — LAB (OUTPATIENT)
Dept: LAB | Facility: HOSPITAL | Age: 73
End: 2018-11-26

## 2018-11-26 DIAGNOSIS — R19.7 DIARRHEA, UNSPECIFIED TYPE: ICD-10-CM

## 2018-11-26 LAB — C DIFF TOX GENS STL QL NAA+PROBE: NOT DETECTED

## 2018-11-26 PROCEDURE — 82656 EL-1 FECAL QUAL/SEMIQ: CPT

## 2018-11-26 PROCEDURE — 87493 C DIFF AMPLIFIED PROBE: CPT | Performed by: INTERNAL MEDICINE

## 2018-11-28 LAB — ELASTASE PANC STL-MCNT: >500 UG ELAST./G

## 2018-11-29 ENCOUNTER — TELEPHONE (OUTPATIENT)
Dept: GASTROENTEROLOGY | Facility: CLINIC | Age: 73
End: 2018-11-29

## 2018-11-29 NOTE — TELEPHONE ENCOUNTER
----- Message from Harry Gooden MD sent at 11/28/2018  5:50 PM EST -----  Let Ms. Atkins know the pancreas elastase test was normal.

## 2019-01-02 ENCOUNTER — HOSPITAL ENCOUNTER (OUTPATIENT)
Dept: ULTRASOUND IMAGING | Facility: HOSPITAL | Age: 74
Discharge: HOME | End: 2019-01-02
Attending: INTERNAL MEDICINE | Admitting: INTERNAL MEDICINE

## 2019-01-02 DIAGNOSIS — K75.81 NASH (NONALCOHOLIC STEATOHEPATITIS): ICD-10-CM

## 2019-01-02 DIAGNOSIS — K74.69 OTHER CIRRHOSIS OF LIVER (HCC): ICD-10-CM

## 2019-01-02 PROCEDURE — 76705 ECHO EXAM OF ABDOMEN: CPT

## 2019-01-03 ENCOUNTER — TELEPHONE (OUTPATIENT)
Dept: GASTROENTEROLOGY | Facility: CLINIC | Age: 74
End: 2019-01-03

## 2019-01-17 ENCOUNTER — HOSPITAL ENCOUNTER (EMERGENCY)
Facility: HOSPITAL | Age: 74
Discharge: HOME | End: 2019-01-17
Attending: EMERGENCY MEDICINE | Admitting: EMERGENCY MEDICINE

## 2019-01-17 VITALS
RESPIRATION RATE: 18 BRPM | BODY MASS INDEX: 29.41 KG/M2 | HEIGHT: 63 IN | TEMPERATURE: 98.2 F | SYSTOLIC BLOOD PRESSURE: 172 MMHG | HEART RATE: 78 BPM | WEIGHT: 166 LBS | DIASTOLIC BLOOD PRESSURE: 72 MMHG | OXYGEN SATURATION: 98 %

## 2019-01-17 DIAGNOSIS — G62.9 PERIPHERAL POLYNEUROPATHY: Primary | ICD-10-CM

## 2019-01-17 PROCEDURE — 99283 EMERGENCY DEPT VISIT LOW MDM: CPT

## 2019-01-17 RX ORDER — TRAMADOL HYDROCHLORIDE 50 MG/1
50 TABLET ORAL EVERY 6 HOURS PRN
COMMUNITY
End: 2019-02-13

## 2019-01-17 NOTE — ED PROVIDER NOTES
Subjective   Yenny Atkins is a 73 y.o.female who presents to the ED with c/o right hand pain with onset one month ago. She reports that she suddenly developed right dorsum and palm hand pain with swelling, radiating to her right fingers and right wrist. She notes that she has been evaluated by Dr. Tucker, Hand Surgery for this complaint who prescribed her medications and gave her a shot of steroids. She notes that these treatments gave her relief for a couple of days but has now worsened which prompted her visit to the ED. She states that she is scheduled for an EMG study at the end of this month. She notes that she is unable to sleep secondary to her severe pain. She has hx of DM and neuropathy but denies any hx of rheumatoid arthritis. There are no other complaints at this time. She denies any trauma or injury to the area.        History provided by:  Patient  Upper Extremity Issue   Location:  Hand  Hand location:  R hand, R palm and dorsum of R hand  Injury: no    Pain details:     Quality:  Aching    Radiates to:  R fingers and R wrist    Severity:  Moderate    Onset quality:  Sudden    Timing:  Constant    Progression:  Worsening  Dislocation: no    Foreign body present:  No foreign bodies  Tetanus status:  Unknown  Prior injury to area:  No  Relieved by:  Nothing  Worsened by:  Nothing  Ineffective treatments: Ortho medication and shot of steroids.  Associated symptoms: swelling and tingling        Review of Systems   Musculoskeletal:        Right hand pain with swelling and redness.   Skin: Positive for color change.   Psychiatric/Behavioral: Positive for sleep disturbance.   All other systems reviewed and are negative.      Past Medical History:   Diagnosis Date   • Cirrhosis (CMS/HCC)     liver   • Crohn disease (CMS/HCC)    • Diabetes mellitus (CMS/HCC)    • Hyperglycemia    • Hypertension    • Hypothyroidism    • Restless legs        Allergies   Allergen Reactions   • Phenergan [Promethazine Hcl]  Mental Status Change   • Tylenol [Acetaminophen] Other (See Comments)     Feels crazy when taking tylenol       Past Surgical History:   Procedure Laterality Date   • COLON RESECTION     [facility]   • KNEE SURGERY Right     TWICE   • UPPER GASTROINTESTINAL ENDOSCOPY         Family History   Problem Relation Age of Onset   • Colon cancer Father    • Colon cancer Paternal Grandfather    • Esophageal cancer Son        Social History     Socioeconomic History   • Marital status:      Spouse name: Not on file   • Number of children: Not on file   • Years of education: Not on file   • Highest education level: Not on file   Tobacco Use   • Smoking status: Never Smoker   Substance and Sexual Activity   • Alcohol use: No   • Drug use: No   • Sexual activity: Defer         Objective   Physical Exam   Constitutional: She is oriented to person, place, and time. She appears well-developed and well-nourished. No distress.   HENT:   Head: Normocephalic and atraumatic.   Nose: Nose normal.   Eyes: Conjunctivae are normal. No scleral icterus.   Neck: Normal range of motion. Neck supple.   Cardiovascular: Normal rate, regular rhythm and normal heart sounds.   No murmur heard.  Pulmonary/Chest: Effort normal and breath sounds normal. No respiratory distress.   Musculoskeletal: She exhibits edema and tenderness.   Uniformly swollen due to inactivity. There is rose erythema across bilateral hands. No wasting of thenar and hypothenar. Negative tinel's at wrist. Positive finkelstein. Bounding radian and ulnar pulse. Negative cheryl's test. Cap refill less than 2 seconds. No rash or lesions. Tenderness across the MP joint of the thumb. No crepitance.    Neurological: She is alert and oriented to person, place, and time.   Skin: Skin is warm and dry. No rash noted. There is erythema.   Psychiatric: She has a normal mood and affect. Her behavior is normal.   Nursing note and vitals  "reviewed.      Procedures         ED Course      No results found for this or any previous visit (from the past 24 hour(s)).  Note: In addition to lab results from this visit, the labs listed above may include labs taken at another facility or during a different encounter within the last 24 hours. Please correlate lab times with ED admission and discharge times for further clarification of the services performed during this visit.    No orders to display     Vitals:    01/17/19 1020 01/17/19 1109   BP: 168/74 172/72   Patient Position: Sitting    Pulse: 78    Resp: 18    Temp: 98.2 °F (36.8 °C)    TempSrc: Oral    SpO2: 98%    Weight: 75.3 kg (166 lb)    Height: 160 cm (63\")      Medications - No data to display  ECG/EMG Results (last 24 hours)     ** No results found for the last 24 hours. **                          MDM  Number of Diagnoses or Management Options  Peripheral polyneuropathy: new and requires workup     Amount and/or Complexity of Data Reviewed  Discuss the patient with other providers: yes        Final diagnoses:   Peripheral polyneuropathy       Documentation assistance provided by erin Moon.  Information recorded by the erin was done at my direction and has been verified and validated by me.     Dilip Moon  01/17/19 1149       Jose Cruz Mitchell PA  01/26/19 0747       Jose Cruz Mitchell PA  01/26/19 0748    "

## 2019-02-13 ENCOUNTER — OFFICE VISIT (OUTPATIENT)
Dept: GASTROENTEROLOGY | Facility: CLINIC | Age: 74
End: 2019-02-13

## 2019-02-13 VITALS
BODY MASS INDEX: 28.6 KG/M2 | DIASTOLIC BLOOD PRESSURE: 78 MMHG | HEIGHT: 63 IN | HEART RATE: 71 BPM | SYSTOLIC BLOOD PRESSURE: 146 MMHG | TEMPERATURE: 97.9 F | WEIGHT: 161.4 LBS | OXYGEN SATURATION: 99 %

## 2019-02-13 DIAGNOSIS — K75.81 LIVER CIRRHOSIS SECONDARY TO NASH (HCC): Primary | ICD-10-CM

## 2019-02-13 DIAGNOSIS — K74.60 LIVER CIRRHOSIS SECONDARY TO NASH (HCC): Primary | ICD-10-CM

## 2019-02-13 PROCEDURE — 99214 OFFICE O/P EST MOD 30 MIN: CPT | Performed by: INTERNAL MEDICINE

## 2019-02-13 RX ORDER — CARVEDILOL 12.5 MG/1
12.5 TABLET ORAL 2 TIMES DAILY WITH MEALS
COMMUNITY
End: 2022-03-18 | Stop reason: ALTCHOICE

## 2019-02-13 RX ORDER — LISINOPRIL 40 MG/1
40 TABLET ORAL DAILY
COMMUNITY
End: 2022-03-18 | Stop reason: ALTCHOICE

## 2019-02-13 NOTE — PROGRESS NOTES
PCP: Taina Mckoy MD    Chief Complaint   Patient presents with   • Follow-up       History of Present Illness:   HPI  Ms. Atkins  returns to the office.  She has experienced less problems with diarrhea recently.  There may be on occasion some urgency to have a bowel movement followed by loose stool.  She denies any gilberto blood in the stool.  Ms. Hall denies any unexplained weight loss.  There is no history of night sweats or fever.  The patient denies any abdominal swelling after meals.  There is no current issue with nausea or vomiting.  She states that her appetite is good overall.  Past Medical History:   Diagnosis Date   • Cirrhosis (CMS/HCC)     liver   • Crohn disease (CMS/HCC)    • Diabetes mellitus (CMS/HCC)    • Hyperglycemia    • Hypertension    • Hypothyroidism    • Restless legs        Past Surgical History:   Procedure Laterality Date   • COLON RESECTION     [facility]   • KNEE SURGERY Right     TWICE   • UPPER GASTROINTESTINAL ENDOSCOPY           Current Outpatient Medications:   •  carvedilol (COREG) 12.5 MG tablet, Take 12.5 mg by mouth 2 (Two) Times a Day With Meals., Disp: , Rfl:   •  cholecalciferol (VITAMIN D3) 1000 units tablet, Daily., Disp: , Rfl:   •  Esomeprazole Magnesium (NEXIUM PO), Take 40 mg by mouth Daily., Disp: , Rfl:   •  gabapentin (NEURONTIN) 300 MG capsule, Take 300 mg by mouth 3 (Three) Times a Day., Disp: , Rfl:   •  glimepiride (AMARYL) 1 MG tablet, Take 2 mg by mouth 2 (Two) Times a Day., Disp: , Rfl:   •  levothyroxine (SYNTHROID, LEVOTHROID) 75 MCG tablet, Take 13 mcg by mouth Daily., Disp: , Rfl:   •  lisinopril (PRINIVIL,ZESTRIL) 20 MG tablet, Take 20 mg by mouth Daily., Disp: , Rfl:   •  pramipexole (MIRAPEX) 0.5 MG tablet, Take 0.25 mg by mouth Daily., Disp: , Rfl:   •  RA VITAMIN B-12 TR 1000 MCG tablet controlled-release, Take 2 tablets by mouth Daily., Disp: , Rfl: 1  •  diclofenac (VOLTAREN) 1 % gel gel, Apply 4 g  topically to the appropriate area as directed 3 (Three) Times a Day., Disp: 100 g, Rfl: 0    Allergies   Allergen Reactions   • Phenergan [Promethazine Hcl] Mental Status Change   • Tylenol [Acetaminophen] Other (See Comments)     Feels crazy when taking tylenol       Family History   Problem Relation Age of Onset   • Colon cancer Father    • Colon cancer Paternal Grandfather    • Esophageal cancer Son        Social History     Socioeconomic History   • Marital status:      Spouse name: Not on file   • Number of children: Not on file   • Years of education: Not on file   • Highest education level: Not on file   Social Needs   • Financial resource strain: Not on file   • Food insecurity - worry: Not on file   • Food insecurity - inability: Not on file   • Transportation needs - medical: Not on file   • Transportation needs - non-medical: Not on file   Occupational History   • Not on file   Tobacco Use   • Smoking status: Never Smoker   Substance and Sexual Activity   • Alcohol use: No   • Drug use: No   • Sexual activity: Defer   Other Topics Concern   • Not on file   Social History Narrative   • Not on file       Review of Systems   Constitutional: Negative for activity change, appetite change, fatigue, fever and unexpected weight change.   HENT: Negative for dental problem, hearing loss, mouth sores, postnasal drip, sneezing, trouble swallowing and voice change.    Eyes: Negative for pain, redness, itching and visual disturbance.   Respiratory: Negative for cough, choking, chest tightness, shortness of breath and wheezing.    Cardiovascular: Negative for chest pain, palpitations and leg swelling.   Gastrointestinal: Negative for abdominal distention, abdominal pain, anal bleeding, blood in stool, constipation, diarrhea, nausea, rectal pain and vomiting.   Endocrine: Negative for cold intolerance, heat intolerance, polydipsia, polyphagia and polyuria.   Genitourinary: Negative.  Negative for dysuria,  enuresis, flank pain, hematuria and urgency.   Musculoskeletal: Negative for arthralgias, back pain, gait problem, joint swelling and myalgias.   Skin: Negative for color change, pallor and rash.   Allergic/Immunologic: Negative for environmental allergies, food allergies and immunocompromised state.   Neurological: Negative for dizziness, tremors, seizures, facial asymmetry, speech difficulty, numbness and headaches.   Hematological: Negative for adenopathy.   Psychiatric/Behavioral: Negative for behavioral problems, confusion, dysphoric mood, hallucinations and self-injury.       Vitals:    02/13/19 1300   BP: 146/78   Pulse: 71   Temp: 97.9 °F (36.6 °C)   SpO2: 99%       Physical Exam   Constitutional: She is oriented to person, place, and time. She appears well-nourished. No distress.   HENT:   Head: Atraumatic.   Mouth/Throat: Oropharynx is clear and moist. No oropharyngeal exudate.   Eyes: EOM are normal. No scleral icterus.   Neck: Neck supple. No thyromegaly present.   Cardiovascular: Normal rate, regular rhythm and normal heart sounds. Exam reveals no gallop.   No murmur heard.  Pulmonary/Chest: Effort normal and breath sounds normal. She has no wheezes. She has no rales.   Abdominal: Soft. Bowel sounds are normal. There is no tenderness. There is no rebound and no guarding.   Musculoskeletal: Normal range of motion. She exhibits no edema.   Lymphadenopathy:     She has no cervical adenopathy.   Neurological: She is alert and oriented to person, place, and time. She exhibits normal muscle tone.   Skin: Skin is dry. No erythema.   Psychiatric: She has a normal mood and affect. Her behavior is normal. Thought content normal.   Vitals reviewed.      Yenny was seen today for follow-up.    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  She has a normal albumin and bilirubin at this time.  The previous EGD did  not demonstrate any evidence for varices.    Diarrhea- the history is most consistent with irritable bowel syndrome.  There has been no evidence for Crohn's disease.  She had a colonoscopy last year by Dr. Cramer with biopsies.  The biopsies were all negative.      Plan: Will give the patient samples of IB Kristine to take twice daily.           Will have follow-up ultrasound of the liver in 6 months.           Will follow-up in the office in 6 months.    I spent over 50% of the office visit counseling and answering questions from the patient.

## 2019-06-16 ENCOUNTER — TELEPHONE (OUTPATIENT)
Dept: GASTROENTEROLOGY | Facility: CLINIC | Age: 74
End: 2019-06-16

## 2019-06-16 NOTE — TELEPHONE ENCOUNTER
Patient was seen today at [facility] by Dr. Durant.  She complains of a one-week history of abdominal pain.  She is minimally tender in the right lower quadrant.  CT scan was performed and shows skip lesions in the small bowel suggesting active Crohn's disease.  No evidence for obstruction.  Recommended Entocort 9 mg daily, follow-up this week with Dr. Gooden.

## 2019-06-17 NOTE — TELEPHONE ENCOUNTER
PT CALLED TODAY ADVISING SHE NEEDED A F/U ASAP WITH DR. NEVAREZ.CHECKED SCHEDULE ; FULL UNTIL MID AUGUST. PT HAS APT ON AUG. 14TH. PT ADVISED HER ENTOCORT IS OVER $500 WITH INSURANCE AND IS UNABLE TO START MEDICATION.    OZZY IS CHECKING TO WITH DR. NEVAREZ. WILL F/U WITH PATIENT.

## 2019-08-14 ENCOUNTER — OFFICE VISIT (OUTPATIENT)
Dept: GASTROENTEROLOGY | Facility: CLINIC | Age: 74
End: 2019-08-14

## 2019-08-14 VITALS
BODY MASS INDEX: 28.99 KG/M2 | DIASTOLIC BLOOD PRESSURE: 80 MMHG | OXYGEN SATURATION: 98 % | TEMPERATURE: 96.8 F | HEIGHT: 63 IN | SYSTOLIC BLOOD PRESSURE: 142 MMHG | WEIGHT: 163.6 LBS | HEART RATE: 82 BPM

## 2019-08-14 DIAGNOSIS — K75.81 LIVER CIRRHOSIS SECONDARY TO NASH (HCC): Primary | ICD-10-CM

## 2019-08-14 DIAGNOSIS — K74.60 LIVER CIRRHOSIS SECONDARY TO NASH (HCC): Primary | ICD-10-CM

## 2019-08-14 PROCEDURE — 99214 OFFICE O/P EST MOD 30 MIN: CPT | Performed by: INTERNAL MEDICINE

## 2019-08-14 NOTE — PROGRESS NOTES
PCP: Taina Mckoy MD    Chief Complaint   Patient presents with   • Follow-up       History of Present Illness:   HPI   Mrs. Atkins returns to the office.  She was experiencing some right lower abdominal discomfort and presented to the [place] emergency room.  The patient subsequently was evaluated by Dr. Contreras at the [facility] who performed a colonoscopy.  The patient did have a couple polyps removed but there was no evidence for Crohn's disease.  Mrs. Atkins denies any abdominal pain at this time.  There is no history of nausea.  She has a couple of bowel movements daily.  There have been no signs of bleeding.  The patient denies any abdominal swelling.  There is no history of change in the color of her eyes or skin.  Past Medical History:   Diagnosis Date   • Cirrhosis (CMS/HCC)     liver   • Crohn disease (CMS/HCC)    • Diabetes mellitus (CMS/HCC)    • Hyperglycemia    • Hypertension    • Hypothyroidism    • Restless legs        Past Surgical History:   Procedure Laterality Date   • COLON RESECTION     [facility]   • [facility]COPY  08/08/2019    DR [facility]   • KNEE SURGERY Right     TWICE   • UPPER GASTROINTESTINAL ENDOSCOPY           Current Outpatient Medications:   •  Esomeprazole Magnesium (NEXIUM PO), Take 40 mg by mouth Daily., Disp: , Rfl:   •  gabapentin (NEURONTIN) 300 MG capsule, Take 300 mg by mouth 3 (Three) Times a Day., Disp: , Rfl:   •  glimepiride (AMARYL) 1 MG tablet, Take 2 mg by mouth 2 (Two) Times a Day., Disp: , Rfl:   •  levothyroxine (SYNTHROID, LEVOTHROID) 75 MCG tablet, Take 13 mcg by mouth Daily., Disp: , Rfl:   •  lisinopril (PRINIVIL,ZESTRIL) 20 MG tablet, Take 20 mg by mouth Daily., Disp: , Rfl:   •  pramipexole (MIRAPEX) 0.5 MG tablet, Take 0.25 mg by mouth Daily., Disp: , Rfl:   •  carvedilol (COREG) 12.5 MG tablet, Take 12.5 mg by mouth 2 (Two) Times a Day With Meals., Disp: , Rfl:     Allergies   Allergen Reactions    • Phenergan [Promethazine Hcl] Mental Status Change   • Tylenol [Acetaminophen] Other (See Comments)     Feels crazy when taking tylenol       Family History   Problem Relation Age of Onset   • Colon cancer Father    • Colon cancer Paternal Grandfather    • Esophageal cancer Son        Social History     Socioeconomic History   • Marital status:      Spouse name: Not on file   • Number of children: Not on file   • Years of education: Not on file   • Highest education level: Not on file   Tobacco Use   • Smoking status: Never Smoker   Substance and Sexual Activity   • Alcohol use: No   • Drug use: No   • Sexual activity: Defer       Review of Systems   Constitutional: Negative for activity change, appetite change, fatigue, fever and unexpected weight change.   HENT: Negative for dental problem, hearing loss, mouth sores, postnasal drip, sneezing, trouble swallowing and voice change.    Eyes: Negative for pain, redness, itching and visual disturbance.   Respiratory: Negative for cough, choking, chest tightness, shortness of breath and wheezing.    Cardiovascular: Negative for chest pain, palpitations and leg swelling.   Gastrointestinal: Negative for abdominal distention, abdominal pain, anal bleeding, blood in stool, constipation, diarrhea, nausea, rectal pain and vomiting.   Endocrine: Negative for cold intolerance, heat intolerance, polydipsia, polyphagia and polyuria.   Genitourinary: Negative.  Negative for dysuria, enuresis, flank pain, hematuria and urgency.   Musculoskeletal: Negative for arthralgias, back pain, gait problem, joint swelling and myalgias.   Skin: Negative for color change, pallor and rash.   Allergic/Immunologic: Negative for environmental allergies, food allergies and immunocompromised state.   Neurological: Negative for dizziness, tremors, seizures, facial asymmetry, speech difficulty, numbness and headaches.   Hematological: Negative for adenopathy.   Psychiatric/Behavioral:  Negative for behavioral problems, confusion, dysphoric mood, hallucinations and self-injury.       Vitals:    08/14/19 1348   BP: 142/80   Pulse: 82   Temp: 96.8 °F (36 °C)   SpO2: 98%       Physical Exam   Constitutional: She is oriented to person, place, and time. She appears well-nourished. No distress.   HENT:   Head: Atraumatic.   Mouth/Throat: Oropharynx is clear and moist. No oropharyngeal exudate.   Eyes: Conjunctivae and EOM are normal. No scleral icterus.   Neck: Neck supple. No thyromegaly present.   Cardiovascular: Normal rate, regular rhythm and normal heart sounds. Exam reveals no gallop.   No murmur heard.  Pulmonary/Chest: Effort normal and breath sounds normal. She has no wheezes. She has no rales.   Abdominal: Soft. Bowel sounds are normal. There is no tenderness. There is no guarding.   Musculoskeletal: Normal range of motion. She exhibits no edema.   Lymphadenopathy:     She has no cervical adenopathy.   Neurological: She is alert and oriented to person, place, and time. She exhibits normal muscle tone.   No asterixis   Skin: Skin is dry. No erythema.   Psychiatric: She has a normal mood and affect. Her behavior is normal. Thought content normal.   Vitals reviewed.      Yenny was seen today for follow-up.    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  There are no signs of decompensation with ascites and encephalopathy.  The patient has undergone previous upper endoscopy by Dr. Narvaez and there were no varices present.      Plan: Will schedule ultrasound for 6 months.           Will return to the office in 6 months.           The patient was instructed to call Dr. Hill's office for follow-up of the pathology.

## 2020-01-09 ENCOUNTER — TELEPHONE (OUTPATIENT)
Dept: GASTROENTEROLOGY | Facility: CLINIC | Age: 75
End: 2020-01-09

## 2020-01-09 DIAGNOSIS — K75.81 LIVER CIRRHOSIS SECONDARY TO NASH (HCC): Primary | ICD-10-CM

## 2020-01-09 DIAGNOSIS — K74.60 LIVER CIRRHOSIS SECONDARY TO NASH (HCC): Primary | ICD-10-CM

## 2020-01-09 NOTE — TELEPHONE ENCOUNTER
Dr Gooden,  Patient called and request a Ultrasound liver order. She has an appointment with you on 2/14/20. Thanks

## 2020-02-05 ENCOUNTER — HOSPITAL ENCOUNTER (OUTPATIENT)
Dept: ULTRASOUND IMAGING | Facility: HOSPITAL | Age: 75
End: 2020-02-05

## 2020-02-06 ENCOUNTER — HOSPITAL ENCOUNTER (OUTPATIENT)
Dept: ULTRASOUND IMAGING | Facility: HOSPITAL | Age: 75
Discharge: HOME | End: 2020-02-06
Admitting: INTERNAL MEDICINE

## 2020-02-06 DIAGNOSIS — K74.60 LIVER CIRRHOSIS SECONDARY TO NASH (HCC): ICD-10-CM

## 2020-02-06 DIAGNOSIS — K75.81 LIVER CIRRHOSIS SECONDARY TO NASH (HCC): ICD-10-CM

## 2020-02-06 PROCEDURE — 76705 ECHO EXAM OF ABDOMEN: CPT

## 2020-02-14 ENCOUNTER — APPOINTMENT (OUTPATIENT)
Dept: LAB | Facility: HOSPITAL | Age: 75
End: 2020-02-14

## 2020-02-14 ENCOUNTER — OFFICE VISIT (OUTPATIENT)
Dept: GASTROENTEROLOGY | Facility: CLINIC | Age: 75
End: 2020-02-14

## 2020-02-14 VITALS
DIASTOLIC BLOOD PRESSURE: 82 MMHG | BODY MASS INDEX: 29.41 KG/M2 | OXYGEN SATURATION: 100 % | HEIGHT: 63 IN | HEART RATE: 80 BPM | WEIGHT: 166 LBS | SYSTOLIC BLOOD PRESSURE: 136 MMHG | TEMPERATURE: 97.5 F

## 2020-02-14 DIAGNOSIS — K74.60 LIVER CIRRHOSIS SECONDARY TO NASH (HCC): Primary | ICD-10-CM

## 2020-02-14 DIAGNOSIS — K75.81 LIVER CIRRHOSIS SECONDARY TO NASH (HCC): Primary | ICD-10-CM

## 2020-02-14 LAB
ALBUMIN SERPL-MCNC: 4 G/DL (ref 3.5–5.2)
ALBUMIN/GLOB SERPL: 1.4 G/DL
ALP SERPL-CCNC: 47 U/L (ref 39–117)
ALT SERPL W P-5'-P-CCNC: 12 U/L (ref 1–33)
ANION GAP SERPL CALCULATED.3IONS-SCNC: 14.3 MMOL/L (ref 5–15)
AST SERPL-CCNC: 23 U/L (ref 1–32)
BILIRUB SERPL-MCNC: 1.2 MG/DL (ref 0.2–1.2)
BUN BLD-MCNC: 14 MG/DL (ref 8–23)
BUN/CREAT SERPL: 13.5 (ref 7–25)
CALCIUM SPEC-SCNC: 8.7 MG/DL (ref 8.6–10.5)
CHLORIDE SERPL-SCNC: 98 MMOL/L (ref 98–107)
CO2 SERPL-SCNC: 23.7 MMOL/L (ref 22–29)
CREAT BLD-MCNC: 1.04 MG/DL (ref 0.57–1)
GFR SERPL CREATININE-BSD FRML MDRD: 52 ML/MIN/1.73
GLOBULIN UR ELPH-MCNC: 2.9 GM/DL
GLUCOSE BLD-MCNC: 374 MG/DL (ref 65–99)
INR PPP: 1.14 (ref 0.85–1.16)
POTASSIUM BLD-SCNC: 3.4 MMOL/L (ref 3.5–5.2)
PROT SERPL-MCNC: 6.9 G/DL (ref 6–8.5)
PROTHROMBIN TIME: 14.1 SECONDS (ref 11.2–14.3)
SODIUM BLD-SCNC: 136 MMOL/L (ref 136–145)

## 2020-02-14 PROCEDURE — 85610 PROTHROMBIN TIME: CPT | Performed by: INTERNAL MEDICINE

## 2020-02-14 PROCEDURE — 99214 OFFICE O/P EST MOD 30 MIN: CPT | Performed by: INTERNAL MEDICINE

## 2020-02-14 PROCEDURE — 36415 COLL VENOUS BLD VENIPUNCTURE: CPT | Performed by: INTERNAL MEDICINE

## 2020-02-14 PROCEDURE — 80053 COMPREHEN METABOLIC PANEL: CPT | Performed by: INTERNAL MEDICINE

## 2020-02-14 NOTE — PROGRESS NOTES
PCP: Taina Mckoy MD    Chief Complaint   Patient presents with   • 6 month f/u     trouble controling bowels.    • Bloated   • Diarrhea     after eating       History of Present Illness:   HPI  Mrs. Atkins returns to the office for follow-up.  The patient denies any abdominal pain with meals.  There is no history of nausea or vomiting.  She has not experienced any change in the color of her eyes or skin.  The patient has some fatigue on occasion but overall is doing well.  She denies any change in bowel habits or signs of bleeding.  Mrs. Atkins has not been started on any new medication.  She does have a problem with restless leg syndrome.  The patient denies any abdominal swelling.  She has no issues with night sweats, fever or chills.  There is no history of unexplained weight loss.  Past Medical History:   Diagnosis Date   • Cirrhosis (CMS/HCC)     liver   • Crohn disease (CMS/HCC)    • Diabetes mellitus (CMS/HCC)    • Hyperglycemia    • Hypertension    • Hypothyroidism    • Restless legs        Past Surgical History:   Procedure Laterality Date   • COLON RESECTION     [facility]   • [facility]  08/08/2019    DR [facility]AS [facility]   • KNEE SURGERY Right     TWICE   • UPPER GASTROINTESTINAL ENDOSCOPY           Current Outpatient Medications:   •  carvedilol (COREG) 12.5 MG tablet, Take 12.5 mg by mouth 2 (Two) Times a Day With Meals., Disp: , Rfl:   •  Esomeprazole Magnesium (NEXIUM PO), Take 40 mg by mouth Daily., Disp: , Rfl:   •  gabapentin (NEURONTIN) 300 MG capsule, Take 300 mg by mouth 3 (Three) Times a Day., Disp: , Rfl:   •  glimepiride (AMARYL) 1 MG tablet, Take 2 mg by mouth 2 (Two) Times a Day., Disp: , Rfl:   •  levothyroxine (SYNTHROID, LEVOTHROID) 75 MCG tablet, Take 13 mcg by mouth Daily., Disp: , Rfl:   •  lisinopril (PRINIVIL,ZESTRIL) 20 MG tablet, Take 20 mg by mouth Daily., Disp: , Rfl:   •  pramipexole (MIRAPEX) 0.5 MG tablet, Take 0.25 mg by  mouth Daily., Disp: , Rfl:     Allergies   Allergen Reactions   • Promethazine Hcl Mental Status Change   • Tylenol [Acetaminophen] Other (See Comments)     Feels crazy when taking tylenol       Family History   Problem Relation Age of Onset   • Colon cancer Father    • Colon cancer Paternal Grandfather    • Esophageal cancer Son        Social History     Socioeconomic History   • Marital status:      Spouse name: Not on file   • Number of children: Not on file   • Years of education: Not on file   • Highest education level: Not on file   Tobacco Use   • Smoking status: Never Smoker   Substance and Sexual Activity   • Alcohol use: No   • Drug use: No   • Sexual activity: Defer       Review of Systems   Constitutional: Negative.  Negative for appetite change, fatigue, fever and unexpected weight change.   HENT: Negative.  Negative for dental problem, mouth sores, postnasal drip, sneezing, trouble swallowing and voice change.    Eyes: Negative.  Negative for pain, redness and itching.   Respiratory: Negative.  Negative for cough, shortness of breath and wheezing.    Cardiovascular: Negative.  Negative for chest pain, palpitations and leg swelling.   Gastrointestinal: Negative.  Negative for abdominal distention, abdominal pain, anal bleeding, blood in stool, constipation, diarrhea, nausea, rectal pain and vomiting.   Endocrine: Negative.  Negative for cold intolerance, heat intolerance, polydipsia and polyuria.   Genitourinary: Negative.  Negative for dysuria, enuresis, flank pain, hematuria and urgency.   Musculoskeletal: Negative.  Negative for arthralgias, back pain, joint swelling and myalgias.   Skin: Negative.  Negative for color change, pallor and rash.   Allergic/Immunologic: Negative.  Negative for environmental allergies, food allergies and immunocompromised state.   Neurological: Negative.  Negative for dizziness, tremors, seizures, facial asymmetry, numbness and headaches.   Hematological: Negative.     Psychiatric/Behavioral: Negative.  Negative for behavioral problems, dysphoric mood, hallucinations and self-injury.       Vitals:    02/14/20 1441   BP: 136/82   Pulse: 80   Temp: 97.5 °F (36.4 °C)   SpO2: 100%       Physical Exam   Constitutional: She is oriented to person, place, and time. She appears well-nourished. No distress.   HENT:   Head: Atraumatic.   Mouth/Throat: Oropharynx is clear and moist. No oropharyngeal exudate.   Eyes: EOM are normal. No scleral icterus.   Neck: Neck supple. No thyromegaly present.   Cardiovascular: Normal rate and regular rhythm. Exam reveals no gallop.   Murmur heard.  Pulmonary/Chest: Effort normal and breath sounds normal. She has no wheezes. She has no rales.   Abdominal: Soft. Bowel sounds are normal. There is no tenderness. There is no rebound and no guarding.   Musculoskeletal: Normal range of motion. She exhibits edema (mild pretibial).   Lymphadenopathy:     She has no cervical adenopathy.   Neurological: She is alert and oriented to person, place, and time. She exhibits normal muscle tone.   No asterixis   Skin: Skin is dry. No erythema.   Psychiatric: She has a normal mood and affect. Her behavior is normal. Thought content normal.   Vitals reviewed.      Yenny was seen today for 6 month f/u, bloated and diarrhea.    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  There was no evidence for liver mass.  She does have a small polyp or stone in the gallbladder.  She is asymptomatic at this juncture.  There have been no signs of gastrointestinal bleeding or hepatic encephalopathy      Plan: Will check CMP and pro time.           Will schedule ultrasound of the liver in August.           The patient will return to the office in August.

## 2020-02-18 ENCOUNTER — TELEPHONE (OUTPATIENT)
Dept: GASTROENTEROLOGY | Facility: CLINIC | Age: 75
End: 2020-02-18

## 2020-02-18 NOTE — TELEPHONE ENCOUNTER
----- Message from Harry Gooden MD sent at 2/17/2020 11:49 AM EST -----  Let Ms. Atkins know the liver panel tests are good.  Thank you,  FATIMAH

## 2020-08-11 ENCOUNTER — HOSPITAL ENCOUNTER (OUTPATIENT)
Dept: ULTRASOUND IMAGING | Facility: HOSPITAL | Age: 75
Discharge: HOME | End: 2020-08-11
Admitting: INTERNAL MEDICINE

## 2020-08-11 DIAGNOSIS — K74.60 LIVER CIRRHOSIS SECONDARY TO NASH (HCC): ICD-10-CM

## 2020-08-11 DIAGNOSIS — K75.81 LIVER CIRRHOSIS SECONDARY TO NASH (HCC): ICD-10-CM

## 2020-08-11 PROCEDURE — 76705 ECHO EXAM OF ABDOMEN: CPT

## 2020-08-12 ENCOUNTER — TELEPHONE (OUTPATIENT)
Dept: GASTROENTEROLOGY | Facility: CLINIC | Age: 75
End: 2020-08-12

## 2020-08-12 NOTE — TELEPHONE ENCOUNTER
----- Message from Harry Gooden MD sent at 8/11/2020  1:56 PM EDT -----  Let Ms. Atkins know there was no mass present in the liver.  There was no fluid around the liver.  Thank you,  NW

## 2020-09-02 ENCOUNTER — OFFICE VISIT (OUTPATIENT)
Dept: GASTROENTEROLOGY | Facility: CLINIC | Age: 75
End: 2020-09-02

## 2020-09-02 DIAGNOSIS — K74.60 LIVER CIRRHOSIS SECONDARY TO NASH (HCC): Primary | ICD-10-CM

## 2020-09-02 DIAGNOSIS — K75.81 LIVER CIRRHOSIS SECONDARY TO NASH (HCC): Primary | ICD-10-CM

## 2020-09-02 PROCEDURE — 99214 OFFICE O/P EST MOD 30 MIN: CPT | Performed by: INTERNAL MEDICINE

## 2020-09-02 RX ORDER — DULOXETIN HYDROCHLORIDE 60 MG/1
60 CAPSULE, DELAYED RELEASE ORAL DAILY
COMMUNITY
Start: 2020-08-24 | End: 2020-10-14

## 2020-09-02 RX ORDER — DULOXETIN HYDROCHLORIDE 30 MG/1
CAPSULE, DELAYED RELEASE ORAL
COMMUNITY
Start: 2020-08-24 | End: 2020-10-14

## 2020-09-02 NOTE — PROGRESS NOTES
You have chosen to receive care through a telephone visit. Do you consent to use a telephone visit for your medical care today? Yes  PCP: Provider, No Known    Chief Complaint   Patient presents with   • Follow-up     DE [place]       History of Present Illness:   HPI  This was a telephone visit.  The patient consented for telephone visit.  Mrs. Atkins returns for follow-up of [place] related cirrhosis.  She denies any abdominal pain or swelling at this time.  There is no history of nausea.  Mrs. Atkins denies any signs of blood in the stool.  There is no history of unexplained weight loss.  Mrs. Atkins has a good appetite at this time.  She has not been aware of any changes in color eyes or skin.  There is no history of unexplained skin rash.  Past Medical History:   Diagnosis Date   • Cirrhosis (CMS/HCC)     liver   • Crohn disease (CMS/HCC)    • Diabetes mellitus (CMS/HCC)    • Hyperglycemia    • Hypertension    • Hypothyroidism    • Neuropathy    • Restless legs        Past Surgical History:   Procedure Laterality Date   • COLON RESECTION     [facility]   • [facility]COPY  08/08/2019    DR [facility]   • KNEE SURGERY Right     TWICE   • UPPER GASTROINTESTINAL ENDOSCOPY           Current Outpatient Medications:   •  carvedilol (COREG) 12.5 MG tablet, Take 12.5 mg by mouth 2 (Two) Times a Day With Meals., Disp: , Rfl:   •  DULoxetine (CYMBALTA) 30 MG capsule, TAKE 1 CAPSULE PO DAILY X14 DAYS, THEN START 60MG CAPSULE PO DAILY., Disp: , Rfl:   •  DULoxetine (CYMBALTA) 60 MG capsule, Take 60 mg by mouth Daily., Disp: , Rfl:   •  Esomeprazole Magnesium (NEXIUM PO), Take 40 mg by mouth Daily., Disp: , Rfl:   •  gabapentin (NEURONTIN) 300 MG capsule, Take 300 mg by mouth 3 (Three) Times a Day., Disp: , Rfl:   •  glimepiride (AMARYL) 1 MG tablet, Take 2 mg by mouth 2 (Two) Times a Day., Disp: , Rfl:   •  levothyroxine (SYNTHROID, LEVOTHROID) 75 MCG tablet, Take 88 mcg by mouth Daily.,  Disp: , Rfl:   •  lisinopril (PRINIVIL,ZESTRIL) 20 MG tablet, Take 20 mg by mouth Daily., Disp: , Rfl:   •  pramipexole (MIRAPEX) 0.5 MG tablet, Take 0.25 mg by mouth Daily., Disp: , Rfl:   •  rivaroxaban (XARELTO) 20 MG tablet, Take 20 mg by mouth Daily., Disp: , Rfl:     Allergies   Allergen Reactions   • Promethazine Hcl Mental Status Change   • Tylenol [Acetaminophen] Other (See Comments)     Feels crazy when taking tylenol       Family History   Problem Relation Age of Onset   • Colon cancer Father    • Colon cancer Paternal Grandfather    • Esophageal cancer Son        Social History     Socioeconomic History   • Marital status:      Spouse name: Not on file   • Number of children: Not on file   • Years of education: Not on file   • Highest education level: Not on file   Tobacco Use   • Smoking status: Never Smoker   Substance and Sexual Activity   • Alcohol use: No   • Drug use: No   • Sexual activity: Defer       Review of Systems   Constitutional: Negative for appetite change, fatigue, fever and unexpected weight change.   HENT: Negative for dental problem, mouth sores, postnasal drip, sneezing, trouble swallowing and voice change.    Eyes: Negative for pain, redness and itching.   Respiratory: Negative for cough, shortness of breath and wheezing.    Cardiovascular: Negative for chest pain, palpitations and leg swelling.   Gastrointestinal: Negative for abdominal distention, abdominal pain, anal bleeding, blood in stool, constipation, diarrhea, nausea, rectal pain and vomiting.   Endocrine: Negative for cold intolerance, heat intolerance, polydipsia and polyuria.   Genitourinary: Negative for dysuria, enuresis, flank pain, hematuria and urgency.   Musculoskeletal: Negative for arthralgias, back pain, joint swelling and myalgias.   Skin: Negative for color change, pallor and rash.   Allergic/Immunologic: Negative for environmental allergies, food allergies and immunocompromised state.   Neurological:  Negative for dizziness, tremors, seizures, facial asymmetry, numbness and headaches.   Psychiatric/Behavioral: Negative for behavioral problems, dysphoric mood, hallucinations and self-injury.       There were no vitals filed for this visit.    Physical Exam    Yenny was seen today for follow-up.    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  The ultrasound in August did not demonstrate any evidence for ascites or liver mass.  The patient has no current history suggestive of encephalopathy or GI bleeding    Plan: Will schedule ultrasound for February 2021.           Will check CMP, pro time and alpha-fetoprotein.           Will follow-up in 6 months.      This was a telephone visit for 15 minutes.

## 2020-09-14 ENCOUNTER — TELEPHONE (OUTPATIENT)
Dept: GASTROENTEROLOGY | Facility: CLINIC | Age: 75
End: 2020-09-14

## 2020-09-16 ENCOUNTER — LAB (OUTPATIENT)
Dept: LAB | Facility: HOSPITAL | Age: 75
End: 2020-09-16

## 2020-09-16 LAB
ALBUMIN SERPL-MCNC: 4 G/DL (ref 3.5–5.2)
ALBUMIN/GLOB SERPL: 1.3 G/DL
ALP SERPL-CCNC: 54 U/L (ref 39–117)
ALPHA-FETOPROTEIN: 2.59 NG/ML (ref 0–8.3)
ALT SERPL W P-5'-P-CCNC: 13 U/L (ref 1–33)
ANION GAP SERPL CALCULATED.3IONS-SCNC: 13.3 MMOL/L (ref 5–15)
AST SERPL-CCNC: 19 U/L (ref 1–32)
BILIRUB SERPL-MCNC: 1.1 MG/DL (ref 0–1.2)
BUN SERPL-MCNC: 11 MG/DL (ref 8–23)
BUN/CREAT SERPL: 10.7 (ref 7–25)
CALCIUM SPEC-SCNC: 8.6 MG/DL (ref 8.6–10.5)
CHLORIDE SERPL-SCNC: 98 MMOL/L (ref 98–107)
CO2 SERPL-SCNC: 28.7 MMOL/L (ref 22–29)
CREAT SERPL-MCNC: 1.03 MG/DL (ref 0.57–1)
GFR SERPL CREATININE-BSD FRML MDRD: 52 ML/MIN/1.73
GLOBULIN UR ELPH-MCNC: 3 GM/DL
GLUCOSE SERPL-MCNC: 199 MG/DL (ref 65–99)
INR PPP: 1.6 (ref 0.85–1.16)
POTASSIUM SERPL-SCNC: 2.9 MMOL/L (ref 3.5–5.2)
PROT SERPL-MCNC: 7 G/DL (ref 6–8.5)
PROTHROMBIN TIME: 18.7 SECONDS (ref 11.5–14)
SODIUM SERPL-SCNC: 140 MMOL/L (ref 136–145)

## 2020-09-16 PROCEDURE — 82105 ALPHA-FETOPROTEIN SERUM: CPT | Performed by: INTERNAL MEDICINE

## 2020-09-16 PROCEDURE — 85610 PROTHROMBIN TIME: CPT | Performed by: INTERNAL MEDICINE

## 2020-09-16 PROCEDURE — 36415 COLL VENOUS BLD VENIPUNCTURE: CPT | Performed by: INTERNAL MEDICINE

## 2020-09-16 PROCEDURE — 80053 COMPREHEN METABOLIC PANEL: CPT | Performed by: INTERNAL MEDICINE

## 2020-09-17 ENCOUNTER — TELEPHONE (OUTPATIENT)
Dept: GASTROENTEROLOGY | Facility: CLINIC | Age: 75
End: 2020-09-17

## 2020-09-17 NOTE — TELEPHONE ENCOUNTER
----- Message from Harry Gooden MD sent at 9/16/2020  4:56 PM EDT -----  Let Ms. Atkins  know the albumin and bilirubin are normal.  Thank you,  FATIMAH

## 2020-10-14 ENCOUNTER — OFFICE VISIT (OUTPATIENT)
Dept: ENDOCRINOLOGY | Facility: CLINIC | Age: 75
End: 2020-10-14

## 2020-10-14 VITALS
TEMPERATURE: 98 F | BODY MASS INDEX: 27.68 KG/M2 | HEART RATE: 69 BPM | WEIGHT: 156.2 LBS | OXYGEN SATURATION: 98 % | SYSTOLIC BLOOD PRESSURE: 124 MMHG | DIASTOLIC BLOOD PRESSURE: 68 MMHG | HEIGHT: 63 IN

## 2020-10-14 DIAGNOSIS — I10 HYPERTENSION, UNSPECIFIED TYPE: ICD-10-CM

## 2020-10-14 DIAGNOSIS — K74.60 NON-ALCOHOLIC CIRRHOSIS (HCC): ICD-10-CM

## 2020-10-14 DIAGNOSIS — E11.65 CONTROLLED TYPE 2 DIABETES MELLITUS WITH HYPERGLYCEMIA, WITHOUT LONG-TERM CURRENT USE OF INSULIN (HCC): Primary | ICD-10-CM

## 2020-10-14 PROBLEM — E11.42 DIABETIC PERIPHERAL NEUROPATHY ASSOCIATED WITH TYPE 2 DIABETES MELLITUS: Status: ACTIVE | Noted: 2020-10-14

## 2020-10-14 LAB
EXPIRATION DATE: NORMAL
HBA1C MFR BLD: 7.6 %
Lab: NORMAL

## 2020-10-14 PROCEDURE — 99213 OFFICE O/P EST LOW 20 MIN: CPT | Performed by: PHYSICIAN ASSISTANT

## 2020-10-14 PROCEDURE — 83036 HEMOGLOBIN GLYCOSYLATED A1C: CPT | Performed by: PHYSICIAN ASSISTANT

## 2020-10-14 RX ORDER — CHOLECALCIFEROL (VITAMIN D3) 25 MCG
2000 CAPSULE ORAL DAILY
COMMUNITY

## 2020-10-14 RX ORDER — METOPROLOL SUCCINATE 100 MG/1
1 TABLET, EXTENDED RELEASE ORAL DAILY
COMMUNITY
Start: 2020-10-08

## 2020-10-14 RX ORDER — SPIRONOLACTONE 25 MG/1
1 TABLET ORAL DAILY
COMMUNITY
Start: 2020-10-08 | End: 2022-03-18 | Stop reason: ALTCHOICE

## 2020-10-14 NOTE — PROGRESS NOTES
"     Office Note      Date: 10/14/2020  Patient Name: Yenny Atkins  MRN: [id number]  : 1945    Chief Complaint   Patient presents with   • Follow-up   • Diabetes       History of Present Illness:   Yenny Atkins is a 75 y.o. female who presents today for type 2 diabetes.  She called recently with severe hyperglycemia.  BP was elevated as well.  She had missed pills.  She had eaten breakfast at [employer] and had a large regular Coke. FSBS was over 500, then decreased to 242.  She reports that she is trying to stay away from regular soda.  She remains on glimepiride.  She is testing FSBS 1x per day.  Most fasting FSBS have been mid to upper 100s for the past 2 weeks.  She notes neuropathy pain in feet.  She remains on gabapentin. She denies any sores or lesions on her feet.  She saw podiatrist about 1.5 months ago.  Eye exam up to date.   She stopped taking T4 about 4 months ago.  Recent TSH was 4.57.  She saw new PCP recently. Spironolactone was restarted.      Subjective      Review of Systems:   Review of Systems   Constitutional: Negative.    HENT: Negative.    Eyes: Negative.    Respiratory: Negative.    Cardiovascular: Negative.    Gastrointestinal: Positive for diarrhea.   Endocrine: Positive for polyuria.   Genitourinary: Positive for frequency.   Musculoskeletal: Positive for arthralgias.   Skin: Negative.    Allergic/Immunologic: Negative.    Neurological: Negative.    Hematological: Negative.    Psychiatric/Behavioral: Positive for confusion.       The following portions of the patient's history were reviewed and updated as appropriate: allergies, current medications, past family history, past medical history, past social history, past surgical history and problem list.    Objective     Vitals:    10/14/20 1312   BP: 124/68   Pulse: 69   Temp: 98 °F (36.7 °C)   SpO2: 98%   Weight: 70.9 kg (156 lb 3.2 oz)   Height: 160 cm (63\")     Body mass index is 27.67 kg/m².    Physical Exam  Vitals signs " reviewed.   Constitutional:       General: She is not in acute distress.     Appearance: Normal appearance.   Cardiovascular:      Pulses:           Dorsalis pedis pulses are 2+ on the right side and 2+ on the left side.        Posterior tibial pulses are 2+ on the right side and 2+ on the left side.   Feet:      Right foot:      Protective Sensation: 8 sites tested. 2 sites sensed.      Skin integrity: Dry skin present.      Toenail Condition: Right toenails are abnormally thick. Fungal disease present.     Left foot:      Protective Sensation: 8 sites tested. 0 sites sensed.      Skin integrity: Dry skin present.      Toenail Condition: Left toenails are abnormally thick.   Neurological:      Mental Status: She is alert.   Psychiatric:         Mood and Affect: Mood and affect normal.         HEMOGLOBIN A1C  Lab Results   Component Value Date    HGBA1C 7.6 10/14/2020       Current Outpatient Medications   Medication Instructions   • ASPIRIN 81 PO 1 tablet, Daily   • carvedilol (COREG) 12.5 mg, Oral, 2 Times Daily With Meals   • Cyanocobalamin ER 1,000 mcg, Oral   • Esomeprazole Magnesium (NEXIUM PO) 40 mg, Oral, Daily   • gabapentin (NEURONTIN) 300 mg, Oral, 3 Times Daily   • glimepiride (AMARYL) 2 mg, Oral, 2 Times Daily   • lisinopril (PRINIVIL,ZESTRIL) 40 mg, Oral, Daily   • magnesium oxide (MAGOX) 400 (241.3 Mg) MG tablet tablet magnesium 250 mg (as magnesium oxide) tablet   Take 1 tablet every day by oral route.   • metoprolol succinate XL (TOPROL-XL) 100 MG 24 hr tablet 1 tablet, Daily   • pramipexole (MIRAPEX) 0.25 mg, Oral, Daily   • rivaroxaban (XARELTO) 20 mg, Oral, Daily   • spironolactone (ALDACTONE) 25 MG tablet 1 tablet, Daily   • Vitamin D-3 2,000 Units, Oral, Daily       Assessment / Plan      Assessment & Plan:  1. Controlled type 2 diabetes mellitus with hyperglycemia, without long-term current use of insulin (CMS/[facility])  A1c is a little better.  This is lower than expected based on FSBS.   Encouraged healthy dietary choices, avoid/limit added sugar in diet.  Recommend testing FSBS later in the day to see what is happening.  If FSBS remain elevated, then will adjust medications.  - POC Glycosylated Hemoglobin (Hb A1C)    2. Hypertension, unspecified type  BP looks good today.    3. Non-alcoholic cirrhosis (CMS/HCC)  Continue follow-up with GI.      Return in about 3 months (around 1/14/2021) for Next scheduled follow up.     CAROL Dominguez  10/14/2020

## 2020-10-31 RX ORDER — GLIMEPIRIDE 2 MG/1
2 TABLET ORAL 2 TIMES DAILY
COMMUNITY
End: 2021-03-24 | Stop reason: SDUPTHER

## 2021-01-28 RX ORDER — LANCETS 33 GAUGE
EACH MISCELLANEOUS
Qty: 100 EACH | Refills: 3 | Status: SHIPPED | OUTPATIENT
Start: 2021-01-28

## 2021-01-28 RX ORDER — BLOOD SUGAR DIAGNOSTIC
STRIP MISCELLANEOUS
Qty: 100 EACH | Refills: 3 | Status: SHIPPED | OUTPATIENT
Start: 2021-01-28

## 2021-02-08 ENCOUNTER — HOSPITAL ENCOUNTER (OUTPATIENT)
Dept: ULTRASOUND IMAGING | Facility: HOSPITAL | Age: 76
Discharge: HOME | End: 2021-02-08
Admitting: INTERNAL MEDICINE

## 2021-02-08 DIAGNOSIS — K74.60 LIVER CIRRHOSIS SECONDARY TO NASH (HCC): ICD-10-CM

## 2021-02-08 DIAGNOSIS — K75.81 LIVER CIRRHOSIS SECONDARY TO NASH (HCC): ICD-10-CM

## 2021-02-08 PROCEDURE — 76705 ECHO EXAM OF ABDOMEN: CPT

## 2021-02-09 ENCOUNTER — TELEPHONE (OUTPATIENT)
Dept: GASTROENTEROLOGY | Facility: CLINIC | Age: 76
End: 2021-02-09

## 2021-02-09 NOTE — TELEPHONE ENCOUNTER
----- Message from Harry Gooden MD sent at 2/8/2021  5:13 PM EST -----  Let Ms. Atkins know there was no evidence of fluid around the liver and this is great.  Thank you,  FATIMAH

## 2021-03-24 RX ORDER — GLIMEPIRIDE 2 MG/1
2 TABLET ORAL 2 TIMES DAILY
Qty: 60 TABLET | Refills: 0 | Status: SHIPPED | OUTPATIENT
Start: 2021-03-24 | End: 2021-03-30 | Stop reason: SDUPTHER

## 2021-03-30 RX ORDER — GLIMEPIRIDE 2 MG/1
2 TABLET ORAL 2 TIMES DAILY
Qty: 60 TABLET | Refills: 0 | Status: SHIPPED | OUTPATIENT
Start: 2021-03-30 | End: 2021-04-01 | Stop reason: SDUPTHER

## 2021-03-30 NOTE — TELEPHONE ENCOUNTER
Future appt 4/14/21 (Note) patient has cancelled her last 3 follow up appt. I noted on RX Last Refill without appt

## 2021-04-01 RX ORDER — GLIMEPIRIDE 2 MG/1
TABLET ORAL
Qty: 60 TABLET | Refills: 0 | Status: SHIPPED | OUTPATIENT
Start: 2021-04-01 | End: 2021-04-27 | Stop reason: SDUPTHER

## 2021-04-27 ENCOUNTER — OFFICE VISIT (OUTPATIENT)
Dept: DIABETES SERVICES | Facility: HOSPITAL | Age: 76
End: 2021-04-27

## 2021-04-27 ENCOUNTER — OFFICE VISIT (OUTPATIENT)
Dept: ENDOCRINOLOGY | Facility: CLINIC | Age: 76
End: 2021-04-27

## 2021-04-27 VITALS
SYSTOLIC BLOOD PRESSURE: 130 MMHG | HEART RATE: 67 BPM | BODY MASS INDEX: 29.13 KG/M2 | DIASTOLIC BLOOD PRESSURE: 82 MMHG | HEIGHT: 63 IN | OXYGEN SATURATION: 98 % | WEIGHT: 164.4 LBS | TEMPERATURE: 97.5 F

## 2021-04-27 DIAGNOSIS — E11.65 TYPE 2 DIABETES MELLITUS WITH HYPERGLYCEMIA, WITHOUT LONG-TERM CURRENT USE OF INSULIN (HCC): Primary | Chronic | ICD-10-CM

## 2021-04-27 LAB
EXPIRATION DATE: NORMAL
HBA1C MFR BLD: 8.3 %
Lab: NORMAL

## 2021-04-27 PROCEDURE — 99213 OFFICE O/P EST LOW 20 MIN: CPT | Performed by: PHYSICIAN ASSISTANT

## 2021-04-27 PROCEDURE — 83036 HEMOGLOBIN GLYCOSYLATED A1C: CPT | Performed by: PHYSICIAN ASSISTANT

## 2021-04-27 PROCEDURE — G0108 DIAB MANAGE TRN  PER INDIV: HCPCS

## 2021-04-27 RX ORDER — ERGOCALCIFEROL 1.25 MG/1
CAPSULE ORAL
COMMUNITY

## 2021-04-27 RX ORDER — GLIMEPIRIDE 2 MG/1
2 TABLET ORAL 2 TIMES DAILY
Qty: 60 TABLET | Refills: 5 | Status: SHIPPED | OUTPATIENT
Start: 2021-04-27 | End: 2021-11-08

## 2021-04-27 RX ORDER — MEMANTINE HYDROCHLORIDE 10 MG/1
1 TABLET ORAL 2 TIMES DAILY
COMMUNITY
Start: 2021-02-23 | End: 2022-03-18 | Stop reason: ALTCHOICE

## 2021-04-27 NOTE — CONSULTS
Diabetes Education    Patient Name:  Yenny Atkins  YOB: 1945  MRN: [id number]  Admit Date:  (Not on file)        Ms. Atkins completed initial diabetes education--60 min walk in visit at request of provider with RN and RD--referral noted. Per provider request, [employer] 2 applied and pt educated on use. Please see media tab for full assessment and notes. Thank you for the referral!    Electronically signed by:  Rocío Gupta RN, [facility]  04/27/21 10:16 EDT

## 2021-05-13 ENCOUNTER — TELEPHONE (OUTPATIENT)
Dept: ENDOCRINOLOGY | Facility: CLINIC | Age: 76
End: 2021-05-13

## 2021-05-13 NOTE — TELEPHONE ENCOUNTER
Spoke with patient.  She was supposed to bring in 2 week reading but her sensor fell off at 13 days.  Advised to bring in what readings she had to her appointment on 6/2/2021.  Patient verbalized understanding.

## 2021-05-13 NOTE — TELEPHONE ENCOUNTER
PATIENT STATES THAT HER SENSOR PROVIDED TO HER TO USE FOR TWO WEEKS HAS COME OUT ONE DAY EARLY. SHE WOULD LIKE TO BE ADVISED AS TO WHAT SHE SHOULD DO.     CALL BACK [phone]

## 2021-11-08 RX ORDER — GLIMEPIRIDE 2 MG/1
TABLET ORAL
Qty: 180 TABLET | Refills: 0 | Status: SHIPPED | OUTPATIENT
Start: 2021-11-08 | End: 2021-11-09

## 2021-11-09 ENCOUNTER — OFFICE VISIT (OUTPATIENT)
Dept: ENDOCRINOLOGY | Facility: CLINIC | Age: 76
End: 2021-11-09

## 2021-11-09 VITALS
HEART RATE: 85 BPM | SYSTOLIC BLOOD PRESSURE: 130 MMHG | OXYGEN SATURATION: 98 % | BODY MASS INDEX: 28.7 KG/M2 | DIASTOLIC BLOOD PRESSURE: 68 MMHG | WEIGHT: 162 LBS | HEIGHT: 63 IN

## 2021-11-09 DIAGNOSIS — E11.65 TYPE 2 DIABETES MELLITUS WITH HYPERGLYCEMIA, WITHOUT LONG-TERM CURRENT USE OF INSULIN (HCC): Primary | Chronic | ICD-10-CM

## 2021-11-09 DIAGNOSIS — I10 HYPERTENSION, UNSPECIFIED TYPE: Chronic | ICD-10-CM

## 2021-11-09 DIAGNOSIS — E11.22 TYPE 2 DIABETES MELLITUS WITH DIABETIC CHRONIC KIDNEY DISEASE, UNSPECIFIED CKD STAGE, UNSPECIFIED WHETHER LONG TERM INSULIN USE (HCC): ICD-10-CM

## 2021-11-09 LAB
EXPIRATION DATE: ABNORMAL
EXPIRATION DATE: NORMAL
GLUCOSE BLDC GLUCOMTR-MCNC: 243 MG/DL (ref 70–130)
HBA1C MFR BLD: 9.7 %
Lab: ABNORMAL
Lab: NORMAL

## 2021-11-09 PROCEDURE — 3046F HEMOGLOBIN A1C LEVEL >9.0%: CPT | Performed by: PHYSICIAN ASSISTANT

## 2021-11-09 PROCEDURE — 83036 HEMOGLOBIN GLYCOSYLATED A1C: CPT | Performed by: PHYSICIAN ASSISTANT

## 2021-11-09 PROCEDURE — 82947 ASSAY GLUCOSE BLOOD QUANT: CPT | Performed by: PHYSICIAN ASSISTANT

## 2021-11-09 PROCEDURE — 99214 OFFICE O/P EST MOD 30 MIN: CPT | Performed by: PHYSICIAN ASSISTANT

## 2021-11-09 RX ORDER — FLECAINIDE ACETATE 50 MG/1
TABLET ORAL
COMMUNITY
Start: 2021-08-31

## 2021-11-09 RX ORDER — MAGNESIUM OXIDE 400 MG/1
TABLET ORAL DAILY
COMMUNITY
Start: 2021-08-31

## 2021-11-09 RX ORDER — FUROSEMIDE 40 MG/1
TABLET ORAL DAILY
COMMUNITY
Start: 2021-10-09 | End: 2022-03-18 | Stop reason: ALTCHOICE

## 2021-11-09 RX ORDER — FENOFIBRATE 67 MG/1
CAPSULE ORAL DAILY
COMMUNITY
Start: 2021-11-08

## 2021-11-09 RX ORDER — HYDRALAZINE HYDROCHLORIDE 50 MG/1
TABLET, FILM COATED ORAL 3 TIMES DAILY
COMMUNITY
Start: 2021-10-07 | End: 2022-03-18 | Stop reason: ALTCHOICE

## 2021-11-09 RX ORDER — HUMAN INSULIN 100 [IU]/ML
INJECTION, SUSPENSION SUBCUTANEOUS
Qty: 15 ML | Refills: 2 | Status: SHIPPED | OUTPATIENT
Start: 2021-11-09 | End: 2022-03-10

## 2021-11-09 RX ORDER — PEN NEEDLE, DIABETIC 30 GX3/16"
1 NEEDLE, DISPOSABLE MISCELLANEOUS DAILY
Qty: 100 EACH | Refills: 3 | Status: SHIPPED | OUTPATIENT
Start: 2021-11-09 | End: 2021-11-09 | Stop reason: SDUPTHER

## 2021-11-09 RX ORDER — HUMAN INSULIN 100 [IU]/ML
INJECTION, SUSPENSION SUBCUTANEOUS
Qty: 15 ML | Refills: 2 | Status: SHIPPED | OUTPATIENT
Start: 2021-11-09 | End: 2021-11-09 | Stop reason: SDUPTHER

## 2021-11-09 RX ORDER — PEN NEEDLE, DIABETIC 30 GX3/16"
1 NEEDLE, DISPOSABLE MISCELLANEOUS DAILY
Qty: 100 EACH | Refills: 3 | Status: SHIPPED | OUTPATIENT
Start: 2021-11-09 | End: 2022-03-10

## 2021-11-09 RX ORDER — AMLODIPINE BESYLATE 10 MG/1
TABLET ORAL DAILY
COMMUNITY
Start: 2021-10-07 | End: 2022-03-10

## 2021-11-09 NOTE — PROGRESS NOTES
"     Office Note      Date: 2021  Patient Name: Yenny Atkins  MRN: [id number]  : 1945    Chief Complaint   Patient presents with   • Diabetes       History of Present Illness:   Yenny Atkins is a 76 y.o. female who presents today for follow up on type 2 diabetes.  Known diabetic complications: nephropathy and peripheral neuropathy.  Current diabetic medications include glimepiride.  Past diabetic medications include metformin, Januvia (headaches).  Current monitoring regimen: testing 1 time per day.  Home blood sugar records: over 200  Any episodes of hypoglycemia? no  Prior visit with diabetes educator/dietician: yes - 2021  Current diet: She reports that she has stopped drinking regular soda. Typically drinking water or sugar-free lemonade.  Feet: No sores or other issues.   Eye exam current (within one year): yes  ACE inhibitor/ARB: Yes, lisinopril.  Statin: No.  She reports going through a very difficult time this year.  She was admitted to [facility] with atrial fibrillation around the beginning of October.  She underwent cardioversion.  She reports that her DIL had surgery for pancreatic cancer recently.  Her  has had health problems.  She reports seeing nephrology a couple of months ago.  She reports that she is having some sort of imaging next month.  Labs on 2021.  Creatinine 1.17, eGFR 45.  Microalbumin negative.    Subjective      Review of Systems:   Review of Systems   Constitutional: Negative.    Cardiovascular: Negative.    Gastrointestinal: Positive for diarrhea (intermittent).   Endocrine: Negative.        The following portions of the patient's history were reviewed and updated as appropriate: allergies, current medications, past family history, past medical history, past social history, past surgical history and problem list.    Objective     Vitals:    21 1434   BP: 130/68   Pulse: 85   SpO2: 98%   Weight: 73.5 kg (162 lb)   Height: 160 cm (63\")   PainSc: 0-No pain "     Body mass index is 28.7 kg/m².    Physical Exam  Vitals reviewed.   Constitutional:       General: She is not in acute distress.  Neurological:      Mental Status: She is alert and oriented to person, place, and time.   Psychiatric:         Mood and Affect: Affect normal.         HEMOGLOBIN A1C  Lab Results   Component Value Date    HGBA1C 9.7 11/09/2021    HGBA1C 8.3 04/27/2021    HGBA1C 7.6 10/14/2020     GLUCOSE  Lab Results   Component Value Date    POCGLU 243 (A) 11/09/2021       Current Outpatient Medications   Medication Instructions   • amLODIPine (NORVASC) 10 MG tablet Daily   • ASPIRIN 81 PO 1 tablet, Daily   • carvedilol (COREG) 12.5 mg, Oral, 2 Times Daily With Meals   • Cyanocobalamin ER 1,000 mcg, Oral   • ergocalciferol (ERGOCALCIFEROL) 1.25 MG (13276 UT) capsule ergocalciferol (vitamin D2) 1,250 mcg (50,000 unit) capsule   TAKE 1 CAPSULE BY MOUTH ONE TIME PER WEEK   • esomeprazole (NEXIUM) 40 mg, Oral, Daily   • fenofibrate micronized (LOFIBRA) 67 MG capsule Daily   • flecainide (TAMBOCOR) 50 MG tablet 2 Times Daily - RT   • furosemide (LASIX) 40 MG tablet Daily   • gabapentin (NEURONTIN) 300 mg, Oral, 3 Times Daily   • glucose blood (OneTouch Verio) test strip qd Dx code: e11.65   • hydrALAZINE (APRESOLINE) 50 MG tablet 3 Times Daily   • Insulin NPH, Human,, Isophane, (NovoLIN N FlexPen) 100 UNIT/ML injection Inject 10 units once daily, titrate up to max 40 units daily   • Insulin Pen Needle (Pen Needles) 32G X 4 MM misc 1 each, Does not apply, Daily   • lisinopril (PRINIVIL,ZESTRIL) 40 mg, Oral, Daily   • magnesium oxide (MAG-OX) 400 MG tablet Daily   • memantine (NAMENDA) 10 MG tablet 1 tablet, 2 times daily   • metoprolol succinate XL (TOPROL-XL) 100 MG 24 hr tablet 1 tablet, Daily   • OneTouch Delica Lancets 33G misc testing 1x per day; E11.65   • pramipexole (MIRAPEX) 0.5 mg, Oral, Daily   • rivaroxaban (XARELTO) 20 mg, Oral, Daily   • spironolactone (ALDACTONE) 25 MG tablet 1 tablet, Daily    • Vitamin D-3 2,000 Units, Oral, Daily       Assessment / Plan      Assessment & Plan:  1. Type 2 diabetes mellitus with hyperglycemia, without long-term current use of insulin (HCC)  A1c significantly increased, above goal.  We discussed treatment options.  Recommend to start basal insulin.  She is concerned about cost.  Will use NPH insulin pens and start once daily.  Advised that she can get Novolin Relion N at [facility] for ~$43 per box of 5 pens.  Stop glimepiride.  Start 10 units once daily in AM, then will adjust from there.  Advised to test blood sugar twice per day and send in readings (fasting, then choose before dinner or bedtime).  She would like to meet with diabetes educator to start insulin.  She will  her prescription and then return to office for instruction.  - POC Glycosylated Hemoglobin (Hb A1C)  - POC Glucose, Blood  - Insulin NPH, Human,, Isophane, (NovoLIN N FlexPen) 100 UNIT/ML injection; Inject 10 units once daily, titrate up to max 40 units daily  Dispense: 15 mL; Refill: 2  - Insulin Pen Needle (Pen Needles) 32G X 4 MM misc; 1 each Daily.  Dispense: 100 each; Refill: 3  - Ambulatory Referral to Diabetic Education    2. Hypertension, unspecified type  BP okay.  Continue current medications.    3. Type 2 diabetes mellitus with diabetic chronic kidney disease, unspecified CKD stage, unspecified whether long term insulin use (HCC)  Encouraged good blood sugar control.  She will continue follow-up with nephrology.      Return in about 3 months (around 2/9/2022) for recheck with A1c. She was advised to contact the office with any interval questions or concerns.    CAROL Dominguez  Endocrinology  11/09/2021

## 2021-11-11 PROBLEM — E11.65 TYPE 2 DIABETES MELLITUS WITH HYPERGLYCEMIA: Status: ACTIVE | Noted: 2020-10-14

## 2021-11-11 PROBLEM — Q43.3: Status: ACTIVE | Noted: 2021-11-11

## 2021-11-11 PROBLEM — Q43.3: Status: RESOLVED | Noted: 2021-11-11 | Resolved: 2021-11-11

## 2021-11-11 PROBLEM — E11.22 TYPE 2 DIABETES MELLITUS WITH CHRONIC KIDNEY DISEASE (HCC): Status: ACTIVE | Noted: 2021-11-11

## 2021-11-11 PROBLEM — E11.65 TYPE 2 DIABETES MELLITUS WITH HYPERGLYCEMIA: Chronic | Status: ACTIVE | Noted: 2020-10-14

## 2021-12-14 ENCOUNTER — TELEPHONE (OUTPATIENT)
Dept: GASTROENTEROLOGY | Facility: CLINIC | Age: 76
End: 2021-12-14

## 2021-12-14 DIAGNOSIS — K75.81 LIVER CIRRHOSIS SECONDARY TO NASH (HCC): Primary | ICD-10-CM

## 2021-12-14 DIAGNOSIS — K74.60 LIVER CIRRHOSIS SECONDARY TO NASH (HCC): Primary | ICD-10-CM

## 2021-12-22 ENCOUNTER — DOCUMENTATION (OUTPATIENT)
Dept: DIABETES SERVICES | Facility: HOSPITAL | Age: 76
End: 2021-12-22

## 2021-12-22 NOTE — PLAN OF CARE
6 mos follow up completed. Keynotes from conversation will be sent to endo provider for further support and management as needed.

## 2022-01-04 ENCOUNTER — HOSPITAL ENCOUNTER (OUTPATIENT)
Dept: ULTRASOUND IMAGING | Facility: HOSPITAL | Age: 77
Discharge: HOME | End: 2022-01-04

## 2022-01-31 RX ORDER — GLIMEPIRIDE 2 MG/1
TABLET ORAL
Qty: 180 TABLET | Refills: 1 | OUTPATIENT
Start: 2022-01-31

## 2022-02-04 ENCOUNTER — HOSPITAL ENCOUNTER (OUTPATIENT)
Dept: ULTRASOUND IMAGING | Facility: HOSPITAL | Age: 77
End: 2022-02-04

## 2022-02-04 ENCOUNTER — TELEPHONE (OUTPATIENT)
Dept: ENDOCRINOLOGY | Facility: CLINIC | Age: 77
End: 2022-02-04

## 2022-02-04 RX ORDER — GLIMEPIRIDE 2 MG/1
TABLET ORAL
Qty: 180 TABLET | Refills: 0 | OUTPATIENT
Start: 2022-02-04

## 2022-02-04 RX ORDER — GLIMEPIRIDE 2 MG/1
TABLET ORAL
Qty: 60 TABLET | Refills: 0 | Status: SHIPPED | OUTPATIENT
Start: 2022-02-04 | End: 2022-02-28

## 2022-02-04 NOTE — TELEPHONE ENCOUNTER
Glimepiride was discontinued at her last visit so I denied refill last week.  New refill request has been sent today.  Please call patient.  Is she still taking the glimepiride and not the insulin?  How are her blood sugars doing?

## 2022-02-04 NOTE — TELEPHONE ENCOUNTER
I called pt to ask about the medication/ refill. No answer. Left detailed VM.   Fever greater than 101/Pain not relieved by Medications/Bleeding that does not stop/Swelling that continues/Persistent Nausea and Vomiting

## 2022-02-04 NOTE — TELEPHONE ENCOUNTER
Spoke with patient.  She reports that she did  insulin from pharmacy after last visit in November.  She decided that she did not want to take it, so did not meet with diabetes educators for insulin start as planned.  She reports fasting readings 128, 143, 148, 144, 194, 201 and bedtime 204, 202, 194.  I told her that I will send in a refill for the glimepiride for now, but I do recommend starting insulin.  Will get this scheduled.  She has an appointment with me in 2.5 weeks.

## 2022-02-18 ENCOUNTER — HOSPITAL ENCOUNTER (OUTPATIENT)
Dept: ULTRASOUND IMAGING | Facility: HOSPITAL | Age: 77
Discharge: HOME | End: 2022-02-18
Admitting: INTERNAL MEDICINE

## 2022-02-18 DIAGNOSIS — K75.81 LIVER CIRRHOSIS SECONDARY TO NASH: ICD-10-CM

## 2022-02-18 DIAGNOSIS — K74.60 LIVER CIRRHOSIS SECONDARY TO NASH: ICD-10-CM

## 2022-02-18 PROCEDURE — 76705 ECHO EXAM OF ABDOMEN: CPT

## 2022-02-23 ENCOUNTER — OFFICE VISIT (OUTPATIENT)
Dept: ENDOCRINOLOGY | Facility: CLINIC | Age: 77
End: 2022-02-23

## 2022-02-23 VITALS
DIASTOLIC BLOOD PRESSURE: 78 MMHG | OXYGEN SATURATION: 100 % | BODY MASS INDEX: 28.88 KG/M2 | SYSTOLIC BLOOD PRESSURE: 130 MMHG | HEART RATE: 85 BPM | WEIGHT: 163 LBS | HEIGHT: 63 IN

## 2022-02-23 DIAGNOSIS — E11.65 TYPE 2 DIABETES MELLITUS WITH HYPERGLYCEMIA, WITHOUT LONG-TERM CURRENT USE OF INSULIN: Primary | Chronic | ICD-10-CM

## 2022-02-23 DIAGNOSIS — R21 RASH OF FOOT: ICD-10-CM

## 2022-02-23 DIAGNOSIS — K74.60 NON-ALCOHOLIC CIRRHOSIS: ICD-10-CM

## 2022-02-23 DIAGNOSIS — N18.31 STAGE 3A CHRONIC KIDNEY DISEASE: ICD-10-CM

## 2022-02-23 LAB
EXPIRATION DATE: ABNORMAL
EXPIRATION DATE: NORMAL
GLUCOSE BLDC GLUCOMTR-MCNC: 219 MG/DL (ref 70–130)
HBA1C MFR BLD: 7.6 %
Lab: ABNORMAL
Lab: NORMAL

## 2022-02-23 PROCEDURE — 83036 HEMOGLOBIN GLYCOSYLATED A1C: CPT | Performed by: PHYSICIAN ASSISTANT

## 2022-02-23 PROCEDURE — 3051F HG A1C>EQUAL 7.0%<8.0%: CPT | Performed by: PHYSICIAN ASSISTANT

## 2022-02-23 PROCEDURE — 82947 ASSAY GLUCOSE BLOOD QUANT: CPT | Performed by: PHYSICIAN ASSISTANT

## 2022-02-23 PROCEDURE — 99214 OFFICE O/P EST MOD 30 MIN: CPT | Performed by: PHYSICIAN ASSISTANT

## 2022-02-28 RX ORDER — GLIMEPIRIDE 2 MG/1
4 TABLET ORAL 2 TIMES DAILY
Qty: 120 TABLET | Refills: 3 | Status: SHIPPED | OUTPATIENT
Start: 2022-02-28 | End: 2022-03-24 | Stop reason: SDUPTHER

## 2022-03-07 ENCOUNTER — TELEPHONE (OUTPATIENT)
Dept: GASTROENTEROLOGY | Facility: CLINIC | Age: 77
End: 2022-03-07

## 2022-03-07 NOTE — TELEPHONE ENCOUNTER
----- Message from Harry Gooden MD sent at 2/18/2022  3:49 PM EST -----  Let Ms. Atkins know the ultrasound shows some sludge in the gallbladder.  There is no fluid around the liver and this is good news.

## 2022-03-18 ENCOUNTER — LAB (OUTPATIENT)
Dept: LAB | Facility: HOSPITAL | Age: 77
End: 2022-03-18

## 2022-03-18 ENCOUNTER — OFFICE VISIT (OUTPATIENT)
Dept: GASTROENTEROLOGY | Facility: CLINIC | Age: 77
End: 2022-03-18

## 2022-03-18 VITALS
SYSTOLIC BLOOD PRESSURE: 138 MMHG | DIASTOLIC BLOOD PRESSURE: 80 MMHG | WEIGHT: 163.8 LBS | TEMPERATURE: 98 F | HEART RATE: 81 BPM | OXYGEN SATURATION: 98 % | HEIGHT: 63 IN | BODY MASS INDEX: 29.02 KG/M2

## 2022-03-18 DIAGNOSIS — K74.60 LIVER CIRRHOSIS SECONDARY TO NASH: ICD-10-CM

## 2022-03-18 DIAGNOSIS — K74.60 LIVER CIRRHOSIS SECONDARY TO NASH: Primary | ICD-10-CM

## 2022-03-18 DIAGNOSIS — K75.81 LIVER CIRRHOSIS SECONDARY TO NASH: ICD-10-CM

## 2022-03-18 DIAGNOSIS — K76.82 HEPATIC ENCEPHALOPATHY: ICD-10-CM

## 2022-03-18 DIAGNOSIS — K75.81 LIVER CIRRHOSIS SECONDARY TO NASH: Primary | ICD-10-CM

## 2022-03-18 PROBLEM — D64.9 CHRONIC ANEMIA: Status: ACTIVE | Noted: 2019-07-18

## 2022-03-18 PROBLEM — E55.9 VITAMIN D DEFICIENCY: Status: ACTIVE | Noted: 2020-10-08

## 2022-03-18 PROBLEM — H26.9 CATARACT: Status: ACTIVE | Noted: 2018-08-24

## 2022-03-18 PROBLEM — E05.90 HYPERTHYROIDISM: Status: ACTIVE | Noted: 2022-03-18

## 2022-03-18 PROBLEM — Z91.89 AT RISK OF DISEASE: Status: ACTIVE | Noted: 2022-03-18

## 2022-03-18 PROBLEM — Z96.659 KNEE JOINT REPLACED BY OTHER MEANS: Status: ACTIVE | Noted: 2022-03-18

## 2022-03-18 PROBLEM — J06.9 UPPER RESPIRATORY INFECTION: Status: ACTIVE | Noted: 2019-12-22

## 2022-03-18 LAB
ALBUMIN SERPL-MCNC: 4.5 G/DL (ref 3.5–5.2)
ALBUMIN/GLOB SERPL: 1.6 G/DL
ALP SERPL-CCNC: 36 U/L (ref 39–117)
ALT SERPL W P-5'-P-CCNC: 13 U/L (ref 1–33)
ANION GAP SERPL CALCULATED.3IONS-SCNC: 11 MMOL/L (ref 5–15)
AST SERPL-CCNC: 22 U/L (ref 1–32)
BASOPHILS # BLD AUTO: 0.01 10*3/MM3 (ref 0–0.2)
BASOPHILS NFR BLD AUTO: 0.2 % (ref 0–1.5)
BILIRUB SERPL-MCNC: 0.8 MG/DL (ref 0–1.2)
BUN SERPL-MCNC: 14 MG/DL (ref 8–23)
BUN/CREAT SERPL: 10.9 (ref 7–25)
CALCIUM SPEC-SCNC: 9.2 MG/DL (ref 8.6–10.5)
CHLORIDE SERPL-SCNC: 104 MMOL/L (ref 98–107)
CO2 SERPL-SCNC: 25 MMOL/L (ref 22–29)
CREAT SERPL-MCNC: 1.29 MG/DL (ref 0.57–1)
DEPRECATED RDW RBC AUTO: 40.4 FL (ref 37–54)
EGFRCR SERPLBLD CKD-EPI 2021: 42.6 ML/MIN/1.73
EOSINOPHIL # BLD AUTO: 0.08 10*3/MM3 (ref 0–0.4)
EOSINOPHIL NFR BLD AUTO: 1.7 % (ref 0.3–6.2)
ERYTHROCYTE [DISTWIDTH] IN BLOOD BY AUTOMATED COUNT: 14 % (ref 12.3–15.4)
GLOBULIN UR ELPH-MCNC: 2.9 GM/DL
GLUCOSE SERPL-MCNC: 205 MG/DL (ref 65–99)
HCT VFR BLD AUTO: 28.7 % (ref 34–46.6)
HGB BLD-MCNC: 9.3 G/DL (ref 12–15.9)
IMM GRANULOCYTES # BLD AUTO: 0.01 10*3/MM3 (ref 0–0.05)
IMM GRANULOCYTES NFR BLD AUTO: 0.2 % (ref 0–0.5)
INR PPP: 1.5 (ref 0.85–1.16)
LYMPHOCYTES # BLD AUTO: 0.69 10*3/MM3 (ref 0.7–3.1)
LYMPHOCYTES NFR BLD AUTO: 15 % (ref 19.6–45.3)
MCH RBC QN AUTO: 26.1 PG (ref 26.6–33)
MCHC RBC AUTO-ENTMCNC: 32.4 G/DL (ref 31.5–35.7)
MCV RBC AUTO: 80.6 FL (ref 79–97)
MONOCYTES # BLD AUTO: 0.3 10*3/MM3 (ref 0.1–0.9)
MONOCYTES NFR BLD AUTO: 6.5 % (ref 5–12)
NEUTROPHILS NFR BLD AUTO: 3.51 10*3/MM3 (ref 1.7–7)
NEUTROPHILS NFR BLD AUTO: 76.4 % (ref 42.7–76)
NRBC BLD AUTO-RTO: 0 /100 WBC (ref 0–0.2)
PLATELET # BLD AUTO: 82 10*3/MM3 (ref 140–450)
PMV BLD AUTO: 12.1 FL (ref 6–12)
POTASSIUM SERPL-SCNC: 4.1 MMOL/L (ref 3.5–5.2)
PROT SERPL-MCNC: 7.4 G/DL (ref 6–8.5)
PROTHROMBIN TIME: 17.6 SECONDS (ref 11.4–14.4)
RBC # BLD AUTO: 3.56 10*6/MM3 (ref 3.77–5.28)
SODIUM SERPL-SCNC: 140 MMOL/L (ref 136–145)
WBC NRBC COR # BLD: 4.6 10*3/MM3 (ref 3.4–10.8)

## 2022-03-18 PROCEDURE — 36415 COLL VENOUS BLD VENIPUNCTURE: CPT

## 2022-03-18 PROCEDURE — 85610 PROTHROMBIN TIME: CPT

## 2022-03-18 PROCEDURE — 99214 OFFICE O/P EST MOD 30 MIN: CPT | Performed by: NURSE PRACTITIONER

## 2022-03-18 PROCEDURE — 80053 COMPREHEN METABOLIC PANEL: CPT

## 2022-03-18 PROCEDURE — 85025 COMPLETE CBC W/AUTO DIFF WBC: CPT

## 2022-03-18 RX ORDER — AMLODIPINE BESYLATE 5 MG/1
5 TABLET ORAL DAILY
COMMUNITY

## 2022-03-18 NOTE — PROGRESS NOTES
Follow Up      Patient Name: Yenny Atkins  : 1945   MRN: [id number]     Chief Complaint:    Chief Complaint   Patient presents with   • Follow-up     CASE       History of Present Illness: Yenny Atkins is a 76 y.o. female who is here today for follow up on CASE cirrhosis.     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  Has some forgetfulness and was started on Namenda in the past but is no longer taking this.  There have been no recent hospitalizations.  Having about 3-4 Bms a day. Her last EGD was several years ago, no varices.  UTD on DEXA- about 1 year ago- no osteoporosis  She her last colon cancer screening was in 2018 with Dr. Cramer- .  She does have iron deficiency anemia, vitamin D deficiency, and B12 deficiency-she is taking supplements for these  US Liver (2022 10:26)    Subjective      Review of Systems:   Review of Systems   Constitutional: Negative for appetite change, unexpected weight gain and unexpected weight loss.   Cardiovascular: Negative for leg swelling.   Gastrointestinal: Positive for abdominal distention and diarrhea. Negative for constipation.        Bloating   Skin: Negative for color change and skin lesions.   Neurological: Positive for memory problem and confusion. Negative for weakness.       Medications:     Current Outpatient Medications:   •  amLODIPine (NORVASC) 5 MG tablet, Take 5 mg by mouth Daily., Disp: , Rfl:   •  Cholecalciferol (Vitamin D-3) 25 MCG (1000 UT) capsule, Take 2,000 Units by mouth Daily., Disp: , Rfl:   •  Cyanocobalamin ER 1000 MCG tablet controlled-release, Take 1,000 mcg by mouth., Disp: , Rfl:   •  ergocalciferol (ERGOCALCIFEROL) 1.25 MG (32017 UT) capsule, ergocalciferol (vitamin D2) 1,250 mcg (50,000 unit) capsule  TAKE 1 CAPSULE BY MOUTH ONE TIME PER WEEK, Disp: , Rfl:   •  esomeprazole (nexIUM) 40 MG capsule, Take 40 mg by mouth Daily., Disp: , Rfl:  "  •  fenofibrate micronized (LOFIBRA) 67 MG capsule, Daily., Disp: , Rfl:   •  flecainide (TAMBOCOR) 50 MG tablet, 2 (Two) Times a Day., Disp: , Rfl:   •  gabapentin (NEURONTIN) 300 MG capsule, Take 300 mg by mouth 3 (Three) Times a Day., Disp: , Rfl:   •  glimepiride (AMARYL) 2 MG tablet, Take 2 tablets by mouth 2 (Two) Times a Day., Disp: 120 tablet, Rfl: 3  •  glucose blood (OneTouch Verio) test strip, qd Dx code: e11.65, Disp: 100 each, Rfl: 3  •  linagliptin (TRADJENTA) 5 MG tablet tablet, Take 5 mg by mouth Daily., Disp: , Rfl:   •  magnesium oxide (MAG-OX) 400 MG tablet, Daily., Disp: , Rfl:   •  metoprolol succinate XL (TOPROL-XL) 100 MG 24 hr tablet, 1 tablet Daily., Disp: , Rfl:   •  OneTouch Delica Lancets 33G misc, testing 1x per day; E11.65, Disp: 100 each, Rfl: 3  •  pramipexole (MIRAPEX) 0.5 MG tablet, Take 0.5 mg by mouth Daily., Disp: , Rfl:   •  rivaroxaban (XARELTO) 20 MG tablet, Take 20 mg by mouth Daily., Disp: , Rfl:   •  riFAXIMin (XIFAXAN) 550 MG tablet, Take 1 tablet by mouth 2 (Two) Times a Day., Disp: 60 tablet, Rfl: 11    Allergies:   Allergies   Allergen Reactions   • Promethazine Other (See Comments)     \"feels funny\"   • Promethazine Hcl Mental Status Change   • Tylenol [Acetaminophen] Other (See Comments)     Feels crazy when taking tylenol       Social History:   Social History     Socioeconomic History   • Marital status:    Tobacco Use   • Smoking status: Never Smoker   • Smokeless tobacco: Never Used   Vaping Use   • Vaping Use: Never used   Substance and Sexual Activity   • Alcohol use: No   • Drug use: No   • Sexual activity: Defer        Surgical History:   Past Surgical History:   Procedure Laterality Date   • COLON RESECTION     [facility]   • [facility]  08/08/2019    [facility]   • KNEE SURGERY Right     TWICE   • UPPER GASTROINTESTINAL ENDOSCOPY          Medical History:   Past Medical History:   Diagnosis Date   • " "Atrial fibrillation (HCC)    • Congenital hyperrotation    • Crohn disease (HCC)    • Fatty liver    • GERD (gastroesophageal reflux disease)    • Hypertension    • Hypothyroidism    • Neuropathy    • Non-alcoholic cirrhosis (HCC)    • Overweight (BMI 25.0-29.9)    • Restless leg syndrome    • Type 2 diabetes mellitus (HCC)         Objective     Physical Exam:  Vital Signs:   Vitals:    03/18/22 0813   BP: 138/80   BP Location: Right arm   Patient Position: Sitting   Cuff Size: Adult   Pulse: 81   Temp: 98 °F (36.7 °C)   TempSrc: Temporal   SpO2: 98%   Weight: 74.3 kg (163 lb 12.8 oz)   Height: 160 cm (63\")     Body mass index is 29.02 kg/m².     Physical Exam  Vitals and nursing note reviewed.   Constitutional:       General: She is not in acute distress.     Appearance: She is well-developed. She is not diaphoretic.   Eyes:      General: No scleral icterus.     Conjunctiva/sclera: Conjunctivae normal.   Neck:      Thyroid: No thyromegaly.   Cardiovascular:      Rate and Rhythm: Normal rate and regular rhythm.   Pulmonary:      Effort: Pulmonary effort is normal.      Breath sounds: Normal breath sounds.   Abdominal:      General: Bowel sounds are normal. There is distension.      Palpations: Abdomen is soft. There is no hepatomegaly or splenomegaly.      Tenderness: There is no abdominal tenderness. There is no guarding or rebound.      Hernia: No hernia is present.   Musculoskeletal:      Cervical back: Neck supple.      Right lower leg: No edema.      Left lower leg: No edema.   Skin:     General: Skin is warm and dry.      Capillary Refill: Capillary refill takes 2 to 3 seconds.      Coloration: Skin is not jaundiced or pale.      Findings: No bruising or petechiae.      Nails: There is no clubbing.      Comments: No palmar erythema   Neurological:      Mental Status: She is alert and oriented to person, place, and time.   Psychiatric:         Behavior: Behavior normal.         Thought Content: Thought content " normal.         Judgment: Judgment normal.         Assessment / Plan      Assessment/Plan:   Diagnoses and all orders for this visit:    1. 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   -Next imaging due 8/22  -She is up-to-date on all of her immunizations and her DEXA scan    2. IBS-D  -     riFAXIMin (XIFAXAN) 550 MG tablet; Take 1 tablet by mouth 3 Times a Day.  Dispense: 42      Follow Up:   Return in about 6 months (around 9/18/2022), or if symptoms worsen or fail to improve.    Plan of care reviewed with the patient at the conclusion of today's visit.  Education was provided regarding diagnosis, management, and any prescribed or recommended OTC medications.  Patient verbalized understanding of and agreement with management plan.       EARNEST Armijo  [facility]

## 2022-03-24 NOTE — TELEPHONE ENCOUNTER
PLEASE CALL IN FOR PT [facility] TO [facility] IN  [place]    PT STATES WE SENT HER A LETTER THAT IT IS NOT COVERED, NOT SURE IF IT NEEDS A PA

## 2022-03-25 RX ORDER — GLIMEPIRIDE 2 MG/1
4 TABLET ORAL 2 TIMES DAILY
Qty: 120 TABLET | Refills: 3 | Status: SHIPPED | OUTPATIENT
Start: 2022-03-25

## 2022-05-13 ENCOUNTER — TELEPHONE (OUTPATIENT)
Dept: ENDOCRINOLOGY | Facility: CLINIC | Age: 77
End: 2022-05-13

## 2022-05-13 NOTE — TELEPHONE ENCOUNTER
PT CALLED STATING HER [facility]PC TOLD HER TO START INSULIN DUE TO HER HIGH BS's. PT STATED SHE HAS A PROCEDURE Tuesday AND NEEDS TO START INSULIN TODAY. SHE REQUESTED A CALL BACK.

## 2022-09-19 ENCOUNTER — OFFICE VISIT (OUTPATIENT)
Dept: GASTROENTEROLOGY | Facility: CLINIC | Age: 77
End: 2022-09-19

## 2022-09-19 ENCOUNTER — LAB (OUTPATIENT)
Dept: LAB | Facility: HOSPITAL | Age: 77
End: 2022-09-19

## 2022-09-19 VITALS
OXYGEN SATURATION: 98 % | BODY MASS INDEX: 27.64 KG/M2 | HEIGHT: 63 IN | DIASTOLIC BLOOD PRESSURE: 54 MMHG | SYSTOLIC BLOOD PRESSURE: 126 MMHG | HEART RATE: 73 BPM | TEMPERATURE: 97.6 F | WEIGHT: 156 LBS

## 2022-09-19 DIAGNOSIS — K75.81 LIVER CIRRHOSIS SECONDARY TO NASH: Primary | ICD-10-CM

## 2022-09-19 DIAGNOSIS — K74.60 LIVER CIRRHOSIS SECONDARY TO NASH: Primary | ICD-10-CM

## 2022-09-19 DIAGNOSIS — K74.60 LIVER CIRRHOSIS SECONDARY TO NASH: ICD-10-CM

## 2022-09-19 DIAGNOSIS — K75.81 LIVER CIRRHOSIS SECONDARY TO NASH: ICD-10-CM

## 2022-09-19 DIAGNOSIS — Z11.59 ENCOUNTER FOR SCREENING FOR OTHER VIRAL DISEASES: ICD-10-CM

## 2022-09-19 LAB
ALBUMIN SERPL-MCNC: 4.3 G/DL (ref 3.5–5.2)
ALBUMIN/GLOB SERPL: 1.3 G/DL
ALP SERPL-CCNC: 39 U/L (ref 39–117)
ALT SERPL W P-5'-P-CCNC: 13 U/L (ref 1–33)
AMMONIA BLD-SCNC: 25 UMOL/L (ref 11–51)
ANION GAP SERPL CALCULATED.3IONS-SCNC: 11.8 MMOL/L (ref 5–15)
AST SERPL-CCNC: 21 U/L (ref 1–32)
BASOPHILS # BLD AUTO: 0.02 10*3/MM3 (ref 0–0.2)
BASOPHILS NFR BLD AUTO: 0.5 % (ref 0–1.5)
BILIRUB SERPL-MCNC: 0.8 MG/DL (ref 0–1.2)
BUN SERPL-MCNC: 25 MG/DL (ref 8–23)
BUN/CREAT SERPL: 15.5 (ref 7–25)
CALCIUM SPEC-SCNC: 9.6 MG/DL (ref 8.6–10.5)
CHLORIDE SERPL-SCNC: 102 MMOL/L (ref 98–107)
CO2 SERPL-SCNC: 24.2 MMOL/L (ref 22–29)
CREAT SERPL-MCNC: 1.61 MG/DL (ref 0.57–1)
DEPRECATED RDW RBC AUTO: 41.2 FL (ref 37–54)
EGFRCR SERPLBLD CKD-EPI 2021: 32.4 ML/MIN/1.73
EOSINOPHIL # BLD AUTO: 0.08 10*3/MM3 (ref 0–0.4)
EOSINOPHIL NFR BLD AUTO: 1.8 % (ref 0.3–6.2)
ERYTHROCYTE [DISTWIDTH] IN BLOOD BY AUTOMATED COUNT: 13.3 % (ref 12.3–15.4)
GLOBULIN UR ELPH-MCNC: 3.3 GM/DL
GLUCOSE SERPL-MCNC: 180 MG/DL (ref 65–99)
HBV SURFACE AB SER RIA-ACNC: NORMAL
HCT VFR BLD AUTO: 28.6 % (ref 34–46.6)
HGB BLD-MCNC: 9.1 G/DL (ref 12–15.9)
INR PPP: 1.63 (ref 0.84–1.13)
LYMPHOCYTES # BLD AUTO: 0.78 10*3/MM3 (ref 0.7–3.1)
LYMPHOCYTES NFR BLD AUTO: 17.6 % (ref 19.6–45.3)
MCH RBC QN AUTO: 26.9 PG (ref 26.6–33)
MCHC RBC AUTO-ENTMCNC: 31.8 G/DL (ref 31.5–35.7)
MCV RBC AUTO: 84.6 FL (ref 79–97)
MONOCYTES # BLD AUTO: 0.27 10*3/MM3 (ref 0.1–0.9)
MONOCYTES NFR BLD AUTO: 6.1 % (ref 5–12)
NEUTROPHILS NFR BLD AUTO: 3.27 10*3/MM3 (ref 1.7–7)
NEUTROPHILS NFR BLD AUTO: 73.8 % (ref 42.7–76)
PLATELET # BLD AUTO: 98 10*3/MM3 (ref 140–450)
PMV BLD AUTO: 12.5 FL (ref 6–12)
POTASSIUM SERPL-SCNC: 4 MMOL/L (ref 3.5–5.2)
PROT SERPL-MCNC: 7.6 G/DL (ref 6–8.5)
PROTHROMBIN TIME: 19.3 SECONDS (ref 11.4–14.4)
RBC # BLD AUTO: 3.38 10*6/MM3 (ref 3.77–5.28)
SODIUM SERPL-SCNC: 138 MMOL/L (ref 136–145)
WBC NRBC COR # BLD: 4.43 10*3/MM3 (ref 3.4–10.8)

## 2022-09-19 PROCEDURE — 85025 COMPLETE CBC W/AUTO DIFF WBC: CPT

## 2022-09-19 PROCEDURE — 99214 OFFICE O/P EST MOD 30 MIN: CPT | Performed by: NURSE PRACTITIONER

## 2022-09-19 PROCEDURE — 86708 HEPATITIS A ANTIBODY: CPT

## 2022-09-19 PROCEDURE — 80053 COMPREHEN METABOLIC PANEL: CPT

## 2022-09-19 PROCEDURE — 36415 COLL VENOUS BLD VENIPUNCTURE: CPT | Performed by: NURSE PRACTITIONER

## 2022-09-19 PROCEDURE — 86706 HEP B SURFACE ANTIBODY: CPT | Performed by: NURSE PRACTITIONER

## 2022-09-19 PROCEDURE — 82140 ASSAY OF AMMONIA: CPT

## 2022-09-19 PROCEDURE — 85610 PROTHROMBIN TIME: CPT

## 2022-09-19 RX ORDER — LEVOTHYROXINE SODIUM 0.05 MG/1
TABLET ORAL
COMMUNITY

## 2022-09-19 RX ORDER — FUROSEMIDE 40 MG/1
40 TABLET ORAL EVERY MORNING
COMMUNITY
Start: 2022-07-06

## 2022-09-19 RX ORDER — DAPAGLIFLOZIN 10 MG/1
TABLET, FILM COATED ORAL
COMMUNITY

## 2022-09-19 NOTE — PROGRESS NOTES
Follow Up      Patient Name: Yenny Atkins  : 1945   MRN: [id number]     Chief Complaint:    Chief Complaint   Patient presents with   • Follow-up       History of Present Illness: Yenny Atkins is a 77 y.o. female who is here today for follow up on CASE cirrhosis.    Taking xifaxan bid.  Still with some forgetfulness and confusion. Previously on namenda but no longer.  Denies jaundice. Having some abdominal bloating this morning but not recurrently.  No LE edema.  Doing well.  Watching sodium intake and getting adequate protein.      UTD on DEXA- about 1 year ago- no osteoporosis  She her last colon cancer screening was in 2018 with Dr. Cramer- .  She does have iron deficiency anemia, vitamin D deficiency, and B12 deficiency-she is taking supplements for these  US Liver (2022 10:26)    Subjective      Review of Systems:   Review of Systems   Constitutional: Negative for appetite change, unexpected weight gain and unexpected weight loss.   Cardiovascular: Negative for leg swelling.   Gastrointestinal: Positive for abdominal distention. Negative for constipation and diarrhea.        Bloating   Skin: Negative for color change and skin lesions.   Neurological: Positive for memory problem and confusion. Negative for weakness.       Medications:     Current Outpatient Medications:   •  amLODIPine (NORVASC) 5 MG tablet, Take 5 mg by mouth Daily., Disp: , Rfl:   •  Cholecalciferol (Vitamin D-3) 25 MCG (1000 UT) capsule, Take 2,000 Units by mouth Daily., Disp: , Rfl:   •  Cyanocobalamin ER 1000 MCG tablet controlled-release, Take 1,000 mcg by mouth., Disp: , Rfl:   •  ergocalciferol (ERGOCALCIFEROL) 1.25 MG (02786 UT) capsule, ergocalciferol (vitamin D2) 1,250 mcg (50,000 unit) capsule  TAKE 1 CAPSULE BY MOUTH ONE TIME PER WEEK, Disp: , Rfl:   •  esomeprazole (nexIUM) 40 MG capsule, Take 40 mg by mouth Daily., Disp: , Rfl:   •  fenofibrate micronized (LOFIBRA) 67 MG capsule, Daily., Disp: , Rfl:   •   "flecainide (TAMBOCOR) 50 MG tablet, 2 (Two) Times a Day., Disp: , Rfl:   •  gabapentin (NEURONTIN) 300 MG capsule, Take 300 mg by mouth 3 (Three) Times a Day., Disp: , Rfl:   •  glimepiride (AMARYL) 2 MG tablet, Take 2 tablets by mouth 2 (Two) Times a Day., Disp: 120 tablet, Rfl: 3  •  glucose blood (OneTouch Verio) test strip, qd Dx code: e11.65, Disp: 100 each, Rfl: 3  •  magnesium oxide (MAG-OX) 400 MG tablet, Daily., Disp: , Rfl:   •  metoprolol succinate XL (TOPROL-XL) 100 MG 24 hr tablet, 1 tablet Daily., Disp: , Rfl:   •  OneTouch Delica Lancets 33G misc, testing 1x per day; E11.65, Disp: 100 each, Rfl: 3  •  pramipexole (MIRAPEX) 0.5 MG tablet, Take 0.5 mg by mouth Daily., Disp: , Rfl:   •  riFAXIMin (XIFAXAN) 550 MG tablet, Take 1 tablet by mouth 2 (Two) Times a Day., Disp: 60 tablet, Rfl: 11  •  rivaroxaban (XARELTO) 20 MG tablet, Take 20 mg by mouth Daily., Disp: , Rfl:   •  dapagliflozin Propanediol (Farxiga) 10 MG tablet, Farxiga 10 mg tablet  Take 1 tablet every day by oral route., Disp: , Rfl:   •  furosemide (LASIX) 40 MG tablet, Take 40 mg by mouth Every Morning., Disp: , Rfl:   •  levothyroxine (SYNTHROID, LEVOTHROID) 50 MCG tablet, levothyroxine 50 mcg tablet  TAKE 1 TABLET BY MOUTH EVERY DAY, Disp: , Rfl:     Allergies:   Allergies   Allergen Reactions   • Promethazine Other (See Comments)     \"feels funny\"   • Promethazine Hcl Mental Status Change   • Tylenol [Acetaminophen] Other (See Comments)     Feels crazy when taking tylenol       Social History:   Social History     Socioeconomic History   • Marital status:    Tobacco Use   • Smoking status: Never Smoker   • Smokeless tobacco: Never Used   Vaping Use   • Vaping Use: Never used   Substance and Sexual Activity   • Alcohol use: No   • Drug use: No   • Sexual activity: Defer        Surgical History:   Past Surgical History:   Procedure Laterality Date   • COLON RESECTION     [facility]   • " "COLONOSCOPY  08/08/2019    DR CONTR[facility]AS [facility]   • KNEE SURGERY Right     TWICE   • UPPER GASTROINTESTINAL ENDOSCOPY          Medical History:   Past Medical History:   Diagnosis Date   • Atrial fibrillation (HCC)    • Congenital hyperrotation    • Crohn disease (HCC)    • Fatty liver    • GERD (gastroesophageal reflux disease)    • Hypertension    • Hypothyroidism    • Neuropathy    • Non-alcoholic cirrhosis (HCC)    • Overweight (BMI 25.0-29.9)    • Restless leg syndrome    • Type 2 diabetes mellitus (HCC)         Objective     Physical Exam:  Vital Signs:   Vitals:    09/19/22 1030   BP: 126/54   BP Location: Left arm   Patient Position: Sitting   Cuff Size: Adult   Pulse: 73   Temp: 97.6 °F (36.4 °C)   TempSrc: Temporal   SpO2: 98%   Weight: 70.8 kg (156 lb)   Height: 160 cm (63\")     Body mass index is 27.63 kg/m².     Physical Exam  Vitals and nursing note reviewed.   Constitutional:       General: She is not in acute distress.     Appearance: She is well-developed. She is not diaphoretic.   Eyes:      General: No scleral icterus.     Conjunctiva/sclera: Conjunctivae normal.   Neck:      Thyroid: No thyromegaly.   Cardiovascular:      Rate and Rhythm: Normal rate and regular rhythm.   Pulmonary:      Effort: Pulmonary effort is normal.      Breath sounds: Normal breath sounds.   Abdominal:      General: Bowel sounds are normal. There is distension.      Palpations: Abdomen is soft. There is no hepatomegaly or splenomegaly.      Tenderness: There is no abdominal tenderness. There is no guarding or rebound.      Hernia: No hernia is present.   Musculoskeletal:      Cervical back: Neck supple.      Right lower leg: No edema.      Left lower leg: No edema.   Skin:     General: Skin is warm and dry.      Capillary Refill: Capillary refill takes 2 to 3 seconds.      Coloration: Skin is not jaundiced or pale.      Findings: No bruising or petechiae.      Nails: There is no clubbing.      Comments: No palmar erythema "   Neurological:      Mental Status: She is alert and oriented to person, place, and time.      Comments: Negative asterixis   Psychiatric:         Behavior: Behavior normal.         Thought Content: Thought content normal.         Judgment: Judgment normal.         Assessment / Plan      Assessment/Plan:   Diagnoses and all orders for this visit:    1. 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  Possible occult HE - on xifaxan  -cirrhosis secondary to CASE  -Low sodium diet   - Increase protein intake  -Avoid alcohol, avoid NSAIDS, avoid raw shellfish  -Last EGD  approx 4-5 years ago- Will reschedule for screening  - Hepatocellular carcinoma surveillance ordered today  - Abdominal imaging every six months  - Last abdominal US 2/22.   -She is up-to-date on all of her immunizations and her DEXA scan  2. Encounter for screening for other viral diseases   -     Hepatitis B Surface Antibody         Follow Up:   Return in about 4 months (around 1/19/2023), or if symptoms worsen or fail to improve.    Plan of care reviewed with the patient at the conclusion of today's visit.  Education was provided regarding diagnosis, management, and any prescribed or recommended OTC medications.  Patient verbalized understanding of and agreement with management plan.         EARNEST Armijo  [facility]

## 2022-09-20 ENCOUNTER — TELEPHONE (OUTPATIENT)
Dept: GASTROENTEROLOGY | Facility: CLINIC | Age: 77
End: 2022-09-20

## 2022-09-20 DIAGNOSIS — D64.9 CHRONIC ANEMIA: Primary | ICD-10-CM

## 2022-09-20 LAB — HAV AB SER QL IA: POSITIVE

## 2022-09-20 NOTE — TELEPHONE ENCOUNTER
----- Message from EARNEST Freire sent at 9/20/2022  7:52 AM EDT -----  Can you let her know that her anemia persists.  I think we should add on a colonoscopy to the EGD since she has not had one since 2017 or 2018.  Looks like she is immune to hepatitis A but not [place] recommend getting this vaccination series through her pharmacy.

## 2022-09-20 NOTE — TELEPHONE ENCOUNTER
I spoke with Ms Atkins. Aleyda's recommendation given. Patient voiced understanding. Patient would rather have Sutab for colon prep.

## 2022-09-28 ENCOUNTER — HOSPITAL ENCOUNTER (OUTPATIENT)
Dept: ULTRASOUND IMAGING | Facility: HOSPITAL | Age: 77
Discharge: HOME | End: 2022-09-28
Admitting: NURSE PRACTITIONER

## 2022-09-28 DIAGNOSIS — K74.60 LIVER CIRRHOSIS SECONDARY TO NASH: ICD-10-CM

## 2022-09-28 DIAGNOSIS — K75.81 LIVER CIRRHOSIS SECONDARY TO NASH: ICD-10-CM

## 2022-09-28 PROCEDURE — 76705 ECHO EXAM OF ABDOMEN: CPT

## 2022-10-12 ENCOUNTER — PREP FOR SURGERY (OUTPATIENT)
Dept: OTHER | Facility: HOSPITAL | Age: 77
End: 2022-10-12

## 2022-10-12 DIAGNOSIS — D64.9 ANEMIA, UNSPECIFIED TYPE: Primary | ICD-10-CM

## 2022-10-18 ENCOUNTER — TELEPHONE (OUTPATIENT)
Dept: GASTROENTEROLOGY | Facility: CLINIC | Age: 77
End: 2022-10-18

## 2022-10-18 DIAGNOSIS — Z12.11 ENCOUNTER FOR SCREENING COLONOSCOPY: Primary | ICD-10-CM

## 2022-10-19 NOTE — TELEPHONE ENCOUNTER
No answer; left voice message. Ms Atkins may hold blood thinner 2-3 days prior to procedure with Dr Gooden on 11/1/2022 per EARNEST Starkey at [facility].

## 2022-10-31 ENCOUNTER — ANESTHESIA EVENT (OUTPATIENT)
Dept: GASTROENTEROLOGY | Facility: HOSPITAL | Age: 77
End: 2022-10-31

## 2022-11-01 ENCOUNTER — ANESTHESIA (OUTPATIENT)
Dept: GASTROENTEROLOGY | Facility: HOSPITAL | Age: 77
End: 2022-11-01

## 2022-11-01 ENCOUNTER — HOSPITAL ENCOUNTER (OUTPATIENT)
Facility: HOSPITAL | Age: 77
Setting detail: HOSPITAL OUTPATIENT SURGERY
Discharge: HOME | End: 2022-11-01
Attending: INTERNAL MEDICINE | Admitting: INTERNAL MEDICINE

## 2022-11-01 VITALS
HEIGHT: 63 IN | OXYGEN SATURATION: 99 % | BODY MASS INDEX: 28.7 KG/M2 | TEMPERATURE: 96.3 F | WEIGHT: 162 LBS | SYSTOLIC BLOOD PRESSURE: 142 MMHG | DIASTOLIC BLOOD PRESSURE: 61 MMHG | RESPIRATION RATE: 16 BRPM | HEART RATE: 80 BPM

## 2022-11-01 DIAGNOSIS — D64.9 ANEMIA, UNSPECIFIED TYPE: ICD-10-CM

## 2022-11-01 LAB — GLUCOSE BLDC GLUCOMTR-MCNC: 173 MG/DL (ref 70–130)

## 2022-11-01 PROCEDURE — 82962 GLUCOSE BLOOD TEST: CPT

## 2022-11-01 PROCEDURE — S0260 H&P FOR SURGERY: HCPCS | Performed by: INTERNAL MEDICINE

## 2022-11-01 PROCEDURE — 88313 SPECIAL STAINS GROUP 2: CPT | Performed by: INTERNAL MEDICINE

## 2022-11-01 PROCEDURE — 43239 EGD BIOPSY SINGLE/MULTIPLE: CPT | Performed by: INTERNAL MEDICINE

## 2022-11-01 PROCEDURE — 88305 TISSUE EXAM BY PATHOLOGIST: CPT | Performed by: INTERNAL MEDICINE

## 2022-11-01 PROCEDURE — 45380 COLONOSCOPY AND BIOPSY: CPT | Performed by: INTERNAL MEDICINE

## 2022-11-01 PROCEDURE — 25010000002 PROPOFOL 10 MG/ML EMULSION: Performed by: NURSE ANESTHETIST, CERTIFIED REGISTERED

## 2022-11-01 RX ORDER — MIDAZOLAM HYDROCHLORIDE 1 MG/ML
0.5 INJECTION INTRAMUSCULAR; INTRAVENOUS
Status: DISCONTINUED | OUTPATIENT
Start: 2022-11-01 | End: 2022-11-01 | Stop reason: HOSPADM

## 2022-11-01 RX ORDER — SODIUM CHLORIDE 0.9 % (FLUSH) 0.9 %
10 SYRINGE (ML) INJECTION AS NEEDED
Status: DISCONTINUED | OUTPATIENT
Start: 2022-11-01 | End: 2022-11-01 | Stop reason: HOSPADM

## 2022-11-01 RX ORDER — SODIUM CHLORIDE 9 MG/ML
50 INJECTION, SOLUTION INTRAVENOUS CONTINUOUS
Status: DISCONTINUED | OUTPATIENT
Start: 2022-11-01 | End: 2022-11-01 | Stop reason: HOSPADM

## 2022-11-01 RX ORDER — PROPOFOL 10 MG/ML
VIAL (ML) INTRAVENOUS AS NEEDED
Status: DISCONTINUED | OUTPATIENT
Start: 2022-11-01 | End: 2022-11-01 | Stop reason: SURG

## 2022-11-01 RX ORDER — FAMOTIDINE 10 MG/ML
20 INJECTION, SOLUTION INTRAVENOUS ONCE
Status: DISCONTINUED | OUTPATIENT
Start: 2022-11-01 | End: 2022-11-01 | Stop reason: HOSPADM

## 2022-11-01 RX ORDER — ACETAMINOPHEN 325 MG/1
650 TABLET ORAL EVERY 6 HOURS PRN
Status: DISCONTINUED | OUTPATIENT
Start: 2022-11-01 | End: 2022-11-01 | Stop reason: HOSPADM

## 2022-11-01 RX ORDER — SODIUM CHLORIDE, SODIUM LACTATE, POTASSIUM CHLORIDE, CALCIUM CHLORIDE 600; 310; 30; 20 MG/100ML; MG/100ML; MG/100ML; MG/100ML
9 INJECTION, SOLUTION INTRAVENOUS CONTINUOUS
Status: DISCONTINUED | OUTPATIENT
Start: 2022-11-01 | End: 2022-11-01 | Stop reason: HOSPADM

## 2022-11-01 RX ORDER — FAMOTIDINE 20 MG/1
20 TABLET, FILM COATED ORAL ONCE
Status: DISCONTINUED | OUTPATIENT
Start: 2022-11-01 | End: 2022-11-01 | Stop reason: HOSPADM

## 2022-11-01 RX ORDER — LIDOCAINE HYDROCHLORIDE 10 MG/ML
INJECTION, SOLUTION EPIDURAL; INFILTRATION; INTRACAUDAL; PERINEURAL AS NEEDED
Status: DISCONTINUED | OUTPATIENT
Start: 2022-11-01 | End: 2022-11-01 | Stop reason: SURG

## 2022-11-01 RX ORDER — IPRATROPIUM BROMIDE AND ALBUTEROL SULFATE 2.5; .5 MG/3ML; MG/3ML
3 SOLUTION RESPIRATORY (INHALATION) ONCE AS NEEDED
Status: DISCONTINUED | OUTPATIENT
Start: 2022-11-01 | End: 2022-11-01 | Stop reason: HOSPADM

## 2022-11-01 RX ORDER — ONDANSETRON 2 MG/ML
4 INJECTION INTRAMUSCULAR; INTRAVENOUS ONCE AS NEEDED
Status: DISCONTINUED | OUTPATIENT
Start: 2022-11-01 | End: 2022-11-01 | Stop reason: HOSPADM

## 2022-11-01 RX ORDER — SODIUM CHLORIDE 0.9 % (FLUSH) 0.9 %
10 SYRINGE (ML) INJECTION EVERY 12 HOURS SCHEDULED
Status: DISCONTINUED | OUTPATIENT
Start: 2022-11-01 | End: 2022-11-01 | Stop reason: HOSPADM

## 2022-11-01 RX ORDER — LIDOCAINE HYDROCHLORIDE 10 MG/ML
0.5 INJECTION, SOLUTION EPIDURAL; INFILTRATION; INTRACAUDAL; PERINEURAL ONCE AS NEEDED
Status: DISCONTINUED | OUTPATIENT
Start: 2022-11-01 | End: 2022-11-01 | Stop reason: HOSPADM

## 2022-11-01 RX ADMIN — SODIUM CHLORIDE, POTASSIUM CHLORIDE, SODIUM LACTATE AND CALCIUM CHLORIDE: 600; 310; 30; 20 INJECTION, SOLUTION INTRAVENOUS at 13:42

## 2022-11-01 RX ADMIN — ACETAMINOPHEN 650 MG: 325 TABLET ORAL at 14:35

## 2022-11-01 RX ADMIN — PROPOFOL 50 MG: 10 INJECTION, EMULSION INTRAVENOUS at 13:55

## 2022-11-01 RX ADMIN — LIDOCAINE HYDROCHLORIDE 100 MG: 10 INJECTION, SOLUTION EPIDURAL; INFILTRATION; INTRACAUDAL; PERINEURAL at 13:50

## 2022-11-01 RX ADMIN — PROPOFOL 25 MG: 10 INJECTION, EMULSION INTRAVENOUS at 14:06

## 2022-11-01 RX ADMIN — PROPOFOL 25 MG: 10 INJECTION, EMULSION INTRAVENOUS at 14:03

## 2022-11-01 RX ADMIN — PROPOFOL 20 MG: 10 INJECTION, EMULSION INTRAVENOUS at 14:09

## 2022-11-01 RX ADMIN — PROPOFOL 20 MG: 10 INJECTION, EMULSION INTRAVENOUS at 14:11

## 2022-11-01 RX ADMIN — PROPOFOL 50 MG: 10 INJECTION, EMULSION INTRAVENOUS at 13:50

## 2022-11-01 RX ADMIN — PROPOFOL 50 MG: 10 INJECTION, EMULSION INTRAVENOUS at 14:00

## 2022-11-01 NOTE — ANESTHESIA POSTPROCEDURE EVALUATION
Patient: Yenny Atkins    Procedure Summary     Date: 11/01/22 Room / Location: [facility] ENDOSCOPY 3 /  [facility]    Anesthesia Start: 1347 Anesthesia Stop: 1426    Procedures:       COLONOSCOPY      ESOPHAGOGASTRODUODENOSCOPY Diagnosis:       Anemia, unspecified type      (Anemia, unspecified type [D64.9])    Surgeons: Harry Gooden MD Provider: Deyvi Arredondo MD    Anesthesia Type: general ASA Status: 3          Anesthesia Type: general    Vitals  Vitals Value Taken Time   /60 11/01/22 1420   Temp 96.3 °F (35.7 °C) 11/01/22 1420   Pulse 70 11/01/22 1426   Resp 16 11/01/22 1420   SpO2 98 % 11/01/22 1426   Vitals shown include unvalidated device data.        Post Anesthesia Care and Evaluation    Patient location during evaluation: PACU  Patient participation: complete - patient participated  Level of consciousness: awake and alert  Pain score: 0  Pain management: adequate    Airway patency: patent  Anesthetic complications: No anesthetic complications  PONV Status: none  Cardiovascular status: hemodynamically stable and acceptable  Respiratory status: nonlabored ventilation, acceptable, nasal cannula and spontaneous ventilation  Hydration status: acceptable

## 2022-11-01 NOTE — H&P
HPI: Ms. Atkins is a 78 yo with a history of CASE cirrhosis, diabetes and hypertension. She presents for screening EGD. The patient denies abdominal pain or nausea. There is no history of GI bleeding. She had a colonoscopy about 4 years ago by Dr. Cramer that was unremarkable. The patient does have a history of anemia. There is no weight loss. She has a good appetite.    PMH: Case cirrhosis           A fibrillation           Diabetes           Hypertension           Hypothyroidism  PSH: Knee surgery            Partial colon resection     All: Promethazine  Meds: see list  Fam Hx: No GI cancer  Soc Hx: , no tobacco  ROS: see HPI    Phy Exam: Gen- pleasant female, no acute distress                     HEENT- oropharynx clear, no lesions                     Chest- clear to auscultation                     Cardiac- murmur heard over precordium                     Abd- soft, bowel sounds normal                     Ext- no edema, no cyanosis                     Neuro- awake, alert, no asterixis    Imp: Case cirrhosis- will proceed with screening EGD to assess for varices.          Screening for colon cancer          Anemia of chronic disease  Plan: Will proceed with EGD and colonoscopy

## 2022-11-01 NOTE — ANESTHESIA PREPROCEDURE EVALUATION
Anesthesia Evaluation     Patient summary reviewed and Nursing notes reviewed   no history of anesthetic complications:  NPO Solid Status: > 8 hours  NPO Liquid Status: > 2 hours           Airway   Mallampati: I  TM distance: >3 FB  Neck ROM: full  No difficulty expected  Dental - normal exam     Pulmonary    (+) recent URI, decreased breath sounds,   (-) shortness of breath  Cardiovascular   Exercise tolerance: good (4-7 METS)    ECG reviewed  Rhythm: regular  Rate: normal    (+) hypertension well controlled less than 2 medications, dysrhythmias Atrial Fib,       Neuro/Psych  GI/Hepatic/Renal/Endo    (+)  GERD,  liver disease fatty liver disease cirrhosis, renal disease, diabetes mellitus type 2 well controlled, thyroid problem hypothyroidism    Musculoskeletal     Abdominal   (+) obese,     Abdomen: soft.   Substance History      OB/GYN          Other   blood dyscrasia thrombocytopenia,                   Anesthesia Plan    ASA 3     general     intravenous induction     Anesthetic plan, risks, benefits, and alternatives have been provided, discussed and informed consent has been obtained with: patient.    Plan discussed with CRNA.        CODE STATUS:

## 2022-11-07 LAB
CYTO UR: NORMAL
LAB AP CASE REPORT: NORMAL
LAB AP CLINICAL INFORMATION: NORMAL
LAB AP SPECIAL STAINS: NORMAL
PATH REPORT.FINAL DX SPEC: NORMAL
PATH REPORT.GROSS SPEC: NORMAL

## 2022-11-11 ENCOUNTER — DOCUMENTATION (OUTPATIENT)
Dept: GASTROENTEROLOGY | Facility: CLINIC | Age: 77
End: 2022-11-11

## 2022-11-11 ENCOUNTER — TELEPHONE (OUTPATIENT)
Dept: GASTROENTEROLOGY | Facility: CLINIC | Age: 77
End: 2022-11-11

## 2022-11-11 RX ORDER — PANTOPRAZOLE SODIUM 40 MG/1
40 TABLET, DELAYED RELEASE ORAL 2 TIMES DAILY
Qty: 180 TABLET | Refills: 3 | Status: SHIPPED | OUTPATIENT
Start: 2022-11-11 | End: 2023-04-05 | Stop reason: SDUPTHER

## 2022-11-11 NOTE — TELEPHONE ENCOUNTER
I TRIED TO CALL MS JOSHI TO GIVE EGD RESULTS. NO ANSWER; LEFT VOICE MESSAGE. I WILL SEND RESULTS TO MY CHART AND THE MAIL.

## 2022-11-11 NOTE — TELEPHONE ENCOUNTER
Spoke with patient and advised. Patient is taking protonix once a day. She would like to know if you can send in rx to her pharmacy for bid .

## 2022-11-11 NOTE — TELEPHONE ENCOUNTER
----- Message from EARNEST Freire sent at 11/11/2022  7:33 AM EST -----  Can you please let her know that her EGD shows Erwin's esophagus.  Erwin's esophagus is thought to be a precancer and acid management is very important.  She also needs to follow reflux precautions.  Her med list shows that she is taking Nexium but can you confirm that she is in fact taking this once daily?  If she is taking it, I am going to increase the dose to twice daily, if she is not taking it, I want her to start it once daily.  She also has evidence of increased pressure in both the colon and stomach from her liver.  No varices were seen.

## 2023-02-02 ENCOUNTER — TELEPHONE (OUTPATIENT)
Dept: GASTROENTEROLOGY | Facility: CLINIC | Age: 78
End: 2023-02-02
Payer: MEDICARE

## 2023-02-02 NOTE — TELEPHONE ENCOUNTER
I SPOKE WITH MS GARRET. SHE HAD LABS DRAWN AT [facility]. I WILL REQUEST LAB REPORTS AND HAVE  TO CALL PATIENT BACK TO RESCHEDULE FOLLOW UP.

## 2023-04-05 ENCOUNTER — LAB (OUTPATIENT)
Dept: LAB | Facility: HOSPITAL | Age: 78
End: 2023-04-05
Payer: MEDICARE

## 2023-04-05 ENCOUNTER — PRIOR AUTHORIZATION (OUTPATIENT)
Dept: GASTROENTEROLOGY | Facility: CLINIC | Age: 78
End: 2023-04-05
Payer: MEDICARE

## 2023-04-05 ENCOUNTER — OFFICE VISIT (OUTPATIENT)
Dept: GASTROENTEROLOGY | Facility: CLINIC | Age: 78
End: 2023-04-05
Payer: MEDICARE

## 2023-04-05 VITALS
WEIGHT: 161.4 LBS | DIASTOLIC BLOOD PRESSURE: 58 MMHG | TEMPERATURE: 97.3 F | BODY MASS INDEX: 28.6 KG/M2 | HEART RATE: 72 BPM | OXYGEN SATURATION: 99 % | SYSTOLIC BLOOD PRESSURE: 130 MMHG | HEIGHT: 63 IN

## 2023-04-05 DIAGNOSIS — K74.60 LIVER CIRRHOSIS SECONDARY TO NASH: Primary | ICD-10-CM

## 2023-04-05 DIAGNOSIS — E43 UNSPECIFIED SEVERE PROTEIN-CALORIE MALNUTRITION: ICD-10-CM

## 2023-04-05 DIAGNOSIS — K76.82 HEPATIC ENCEPHALOPATHY: ICD-10-CM

## 2023-04-05 DIAGNOSIS — K22.70 BARRETT'S ESOPHAGUS WITHOUT DYSPLASIA: ICD-10-CM

## 2023-04-05 DIAGNOSIS — K75.81 LIVER CIRRHOSIS SECONDARY TO NASH: ICD-10-CM

## 2023-04-05 DIAGNOSIS — K74.60 LIVER CIRRHOSIS SECONDARY TO NASH: ICD-10-CM

## 2023-04-05 DIAGNOSIS — K75.81 LIVER CIRRHOSIS SECONDARY TO NASH: Primary | ICD-10-CM

## 2023-04-05 LAB
INR PPP: 1.52 (ref 0.84–1.13)
PROTHROMBIN TIME: 18.3 SECONDS (ref 11.4–14.4)

## 2023-04-05 PROCEDURE — 99214 OFFICE O/P EST MOD 30 MIN: CPT | Performed by: NURSE PRACTITIONER

## 2023-04-05 PROCEDURE — 3075F SYST BP GE 130 - 139MM HG: CPT | Performed by: NURSE PRACTITIONER

## 2023-04-05 PROCEDURE — 1160F RVW MEDS BY RX/DR IN RCRD: CPT | Performed by: NURSE PRACTITIONER

## 2023-04-05 PROCEDURE — 1159F MED LIST DOCD IN RCRD: CPT | Performed by: NURSE PRACTITIONER

## 2023-04-05 PROCEDURE — 85610 PROTHROMBIN TIME: CPT

## 2023-04-05 PROCEDURE — 3078F DIAST BP <80 MM HG: CPT | Performed by: NURSE PRACTITIONER

## 2023-04-05 PROCEDURE — 36415 COLL VENOUS BLD VENIPUNCTURE: CPT

## 2023-04-05 RX ORDER — TELMISARTAN 20 MG/1
0.5 TABLET ORAL DAILY
COMMUNITY
Start: 2023-03-25

## 2023-04-05 RX ORDER — BLOOD-GLUCOSE METER
1 EACH MISCELLANEOUS 3 TIMES DAILY
COMMUNITY
Start: 2023-01-30

## 2023-04-05 RX ORDER — PEN NEEDLE, DIABETIC 32GX 5/32"
NEEDLE, DISPOSABLE MISCELLANEOUS
COMMUNITY
Start: 2023-03-13

## 2023-04-05 RX ORDER — PANTOPRAZOLE SODIUM 40 MG/1
40 TABLET, DELAYED RELEASE ORAL 2 TIMES DAILY
Qty: 180 TABLET | Refills: 3 | Status: SHIPPED | OUTPATIENT
Start: 2023-04-05

## 2023-04-05 RX ORDER — FERROUS SULFATE 325(65) MG
1 TABLET ORAL DAILY
COMMUNITY
Start: 2023-03-29

## 2023-04-05 RX ORDER — RIVAROXABAN 15 MG/1
1 TABLET, FILM COATED ORAL DAILY
COMMUNITY
Start: 2023-02-14

## 2023-04-05 RX ORDER — INSULIN ASPART 100 [IU]/ML
INJECTION, SUSPENSION SUBCUTANEOUS
COMMUNITY

## 2023-04-05 NOTE — TELEPHONE ENCOUNTER
Message from Plan  PA Case: [id number], Status: Approved, Coverage Starts on: 1/1/2023 12:00:00 AM, Coverage Ends on: 12/31/2023 12:00:00 AM. Questions? Contact [phone].

## 2023-04-05 NOTE — PROGRESS NOTES
Follow Up      Patient Name: Yenny Atkins  : 1945   MRN: [id number]     Chief Complaint:    Chief Complaint   Patient presents with   • Follow-up       History of Present Illness: Yenny Atkins is a 77 y.o. female who is here today for follow up on CASE cirrhosis.    Was taking xifaxan BID but stopped. Still with some forgetfulness and confusion- feels like this may be worse lately.  Denies jaundice. No ascites.  Some LE edema. Hospitalized last week at [facility] for cellulitis.  Watching sodium intake and getting adequate protein.  Having fatigue. Having 3-4 loose bms a day.  Last A1c was 6.8      UTD on DEXA- about 1 year ago- no osteoporosis  She does have iron deficiency anemia, vitamin D deficiency, and B12 deficiency-she is taking supplements for these    UPPER GI ENDOSCOPY (2022 13:12)-mild hiatal hernia, mucosal changes suspicious for short segment Erwin's, biopsied, portal hypertensive gastropathy, gastritis.  Apices consistent with Erwin's, negative for H. pylori and celiac  COLONOSCOPY (2022 13:11)-congested mucosa throughout consistent with portal colopathy, nonbleeding internal hemorrhoids    Subjective      Review of Systems:   Review of Systems   Constitutional: Negative for appetite change, unexpected weight gain and unexpected weight loss.   Cardiovascular: Negative for leg swelling.   Gastrointestinal: Positive for abdominal distention and diarrhea. Negative for constipation.        Bloating   Skin: Negative for color change and skin lesions.   Neurological: Positive for memory problem and confusion. Negative for weakness.       Medications:     Current Outpatient Medications:   •  amLODIPine (NORVASC) 5 MG tablet, Take 1 tablet by mouth Daily., Disp: , Rfl:   •  Cholecalciferol (Vitamin D-3) 25 MCG (1000 UT) capsule, Take 2,000 Units by mouth Daily., Disp: , Rfl:   •  Cyanocobalamin ER 1000 MCG tablet controlled-release, Take 1,000 mcg by mouth., Disp: , Rfl:   •   dapagliflozin Propanediol (Farxiga) 10 MG tablet, Farxiga 10 mg tablet  Take 1 tablet every day by oral route., Disp: , Rfl:   •  ergocalciferol (ERGOCALCIFEROL) 1.25 MG (81350 UT) capsule, ergocalciferol (vitamin D2) 1,250 mcg (50,000 unit) capsule  TAKE 1 CAPSULE BY MOUTH ONE TIME PER WEEK, Disp: , Rfl:   •  fenofibrate micronized (LOFIBRA) 67 MG capsule, Daily., Disp: , Rfl:   •  furosemide (LASIX) 40 MG tablet, Take 1 tablet by mouth Every Morning., Disp: , Rfl:   •  gabapentin (NEURONTIN) 300 MG capsule, Take 1 capsule by mouth 3 (Three) Times a Day., Disp: , Rfl:   •  glucose blood (OneTouch Verio) test strip, qd Dx code: e11.65, Disp: 100 each, Rfl: 3  •  levothyroxine (SYNTHROID, LEVOTHROID) 50 MCG tablet, levothyroxine 50 mcg tablet  TAKE 1 TABLET BY MOUTH EVERY DAY, Disp: , Rfl:   •  magnesium oxide (MAG-OX) 400 MG tablet, Daily., Disp: , Rfl:   •  metoprolol succinate XL (TOPROL-XL) 100 MG 24 hr tablet, 1 tablet Daily., Disp: , Rfl:   •  OneTouch Delica Lancets 33G misc, testing 1x per day; E11.65, Disp: 100 each, Rfl: 3  •  pantoprazole (PROTONIX) 40 MG EC tablet, Take 1 tablet by mouth 2 (Two) Times a Day., Disp: 180 tablet, Rfl: 3  •  pramipexole (MIRAPEX) 0.5 MG tablet, Take 1 tablet by mouth Daily., Disp: , Rfl:   •  riFAXIMin (XIFAXAN) 550 MG tablet, Take 1 tablet by mouth 2 (Two) Times a Day., Disp: 60 tablet, Rfl: 11  •  BD Pen Needle Armida 2nd Gen 32G X 4 MM misc, USE AS DIRECTED TWICE A DAY WITH INJECTIONS, Disp: , Rfl:   •  Blood Glucose Monitoring Suppl (Accu-Chek Guide Me) w/Device kit, 1 each by Other route 3 (Three) Times a Day. use to test blood sugar 3 times daily, Disp: , Rfl:   •  ferrous sulfate 325 (65 FE) MG tablet, Take 1 tablet by mouth Daily., Disp: , Rfl:   •  flecainide (TAMBOCOR) 50 MG tablet, 2 (Two) Times a Day. (Patient not taking: Reported on 4/5/2023), Disp: , Rfl:   •  glimepiride (AMARYL) 2 MG tablet, Take 2 tablets by mouth 2 (Two) Times a Day. (Patient not taking:  "Reported on 4/5/2023), Disp: 120 tablet, Rfl: 3  •  insulin aspart prot & aspart (NovoLOG Mix 70/30 FlexPen) (70-30) 100 UNIT/ML suspension pen-injector injection, Novolog Mix 70-30 FlexPen U-100 Insulin 100 unit/mL subcutaneous pen  Administer 20 units before breakfast and 20 units before dinner, Disp: , Rfl:   •  telmisartan (MICARDIS) 20 MG tablet, Take 0.5 tablets by mouth Daily., Disp: , Rfl:   •  Xarelto 15 MG tablet, Take 1 tablet by mouth Daily., Disp: , Rfl:     Allergies:   Allergies   Allergen Reactions   • Promethazine Other (See Comments)     \"feels funny\"   • Promethazine Hcl Mental Status Change   • Tylenol [Acetaminophen] Other (See Comments)     Feels crazy when taking tylenol       Social History:   Social History     Socioeconomic History   • Marital status:    Tobacco Use   • Smoking status: Never   • Smokeless tobacco: Never   Vaping Use   • Vaping Use: Never used   Substance and Sexual Activity   • Alcohol use: No   • Drug use: No   • Sexual activity: Defer        Surgical History:   Past Surgical History:   Procedure Laterality Date   • COLON RESECTION     [facility]   • [facility]  [facility]/08/[facility]   • COLONOSCOPY N/A 11/1/2022    Procedure: COLONOSCOPY;  Surgeon: Harry Gooden MD;  Location:  [facility];  Service: Gastroenterology;  Laterality: N/A;   • ENDOSCOPY N/A 11/1/2022    Procedure: ESOPHAGOGASTRODUODENOSCOPY;  Surgeon: Harry Gooden MD;  Location:  [facility];  Service: Gastroenterology;  Laterality: N/A;   • KNEE SURGERY Right     TWICE   • UPPER GASTROINTESTINAL ENDOSCOPY          Medical History:   Past Medical History:   Diagnosis Date   • Atrial fibrillation    • Congenital hyperrotation    • Crohn disease    • Fatty liver    • GERD (gastroesophageal reflux disease)    • Hypertension    • Hypothyroidism    • Neuropathy    • Non-alcoholic cirrhosis    • Overweight (BMI 25.0-29.9)    • " "Restless leg syndrome    • Type 2 diabetes mellitus         Objective     Physical Exam:  Vital Signs:   Vitals:    04/05/23 0950   BP: 130/58   BP Location: Left arm   Patient Position: Sitting   Cuff Size: Adult   Pulse: 72   Temp: 97.3 °F (36.3 °C)   TempSrc: Temporal   SpO2: 99%   Weight: 73.2 kg (161 lb 6.4 oz)   Height: 160 cm (62.99\")     Body mass index is 28.6 kg/m².     Physical Exam  Vitals and nursing note reviewed.   Constitutional:       General: She is not in acute distress.     Appearance: She is well-developed. She is not diaphoretic.   Eyes:      General: No scleral icterus.     Conjunctiva/sclera: Conjunctivae normal.   Neck:      Thyroid: No thyromegaly.   Cardiovascular:      Rate and Rhythm: Normal rate and regular rhythm.   Pulmonary:      Effort: Pulmonary effort is normal.      Breath sounds: Normal breath sounds.   Abdominal:      General: Bowel sounds are normal. There is distension.      Palpations: Abdomen is soft. There is no shifting dullness, hepatomegaly or splenomegaly.      Tenderness: There is no abdominal tenderness. There is no guarding or rebound.      Hernia: No hernia is present.   Musculoskeletal:      Cervical back: Neck supple.      Right lower leg: No edema.      Left lower leg: No edema.   Skin:     General: Skin is warm and dry.      Capillary Refill: Capillary refill takes 2 to 3 seconds.      Coloration: Skin is not jaundiced or pale.      Findings: No bruising or petechiae.      Nails: There is no clubbing.      Comments: No palmar erythema   Neurological:      Mental Status: She is alert and oriented to person, place, and time.      Comments: Negative asterixis   Psychiatric:         Behavior: Behavior normal.         Thought Content: Thought content normal.         Judgment: Judgment normal.         Assessment / Plan      Assessment/Plan:   Diagnoses and all orders for this visit:    1. 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  Possible occult HE - on xifaxan-previously on Namenda for dementia  -cirrhosis secondary to CASE  -Low sodium diet   - Increase protein intake  -Avoid alcohol, avoid NSAIDS, avoid raw shellfish  - Hepatocellular carcinoma surveillance ordered today  - Abdominal imaging every six months  - Last abdominal US 9/22.   -She is up-to-date on all of her immunizations and her DEXA scan  2. Hepatic encephalopathy  -     riFAXIMin (XIFAXAN) 550 MG tablet; Take 1 tablet by mouth 2 (Two) Times a Day.  Dispense: 60 tablet; Refill: 11  -     Protime-INR; Future  -     Comprehensive Metabolic Panel; Future  -     CBC & Differential; Future  -     US Liver; Future  -     Vitamin D,25-Hydroxy; Future  History of chronic diarrhea, would avoid lactulose if possible.  Resume Xifaxan and small frequent meals.  Instructions written down for patient  3. Unspecified severe protein-calorie malnutrition  -     Vitamin D,25-Hydroxy; Future    4. 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  Dispense: 180 tablet; Refill: 3         Follow Up:   Return in about 3 months (around 7/5/2023), or if symptoms worsen or fail to improve.    Plan of care reviewed with the patient at the conclusion of today's visit.  Education was provided regarding diagnosis, management, and any prescribed or recommended OTC medications.  Patient verbalized understanding of and agreement with management plan.       EARNEST Armijo  [facility] [place] [facility]

## 2023-04-06 LAB
25(OH)D3+25(OH)D2 SERPL-MCNC: 49.9 NG/ML (ref 30–100)
ALBUMIN SERPL-MCNC: 4.2 G/DL (ref 3.5–5.2)
ALBUMIN/GLOB SERPL: 1.7 G/DL
ALP SERPL-CCNC: 41 U/L (ref 39–117)
ALT SERPL-CCNC: 14 U/L (ref 1–33)
AST SERPL-CCNC: 18 U/L (ref 1–32)
BASOPHILS # BLD AUTO: 0.01 10*3/MM3 (ref 0–0.2)
BASOPHILS NFR BLD AUTO: 0.2 % (ref 0–1.5)
BILIRUB SERPL-MCNC: 0.6 MG/DL (ref 0–1.2)
BUN SERPL-MCNC: 22 MG/DL (ref 8–23)
BUN/CREAT SERPL: 14.7 (ref 7–25)
CALCIUM SERPL-MCNC: 9.2 MG/DL (ref 8.6–10.5)
CHLORIDE SERPL-SCNC: 105 MMOL/L (ref 98–107)
CO2 SERPL-SCNC: 25 MMOL/L (ref 22–29)
CREAT SERPL-MCNC: 1.5 MG/DL (ref 0.57–1)
EGFRCR SERPLBLD CKD-EPI 2021: 35.3 ML/MIN/1.73
EOSINOPHIL # BLD AUTO: 0.12 10*3/MM3 (ref 0–0.4)
EOSINOPHIL NFR BLD AUTO: 2.1 % (ref 0.3–6.2)
ERYTHROCYTE [DISTWIDTH] IN BLOOD BY AUTOMATED COUNT: 13.7 % (ref 12.3–15.4)
GLOBULIN SER CALC-MCNC: 2.5 GM/DL
GLUCOSE SERPL-MCNC: 104 MG/DL (ref 65–99)
HCT VFR BLD AUTO: 28.7 % (ref 34–46.6)
HGB BLD-MCNC: 9.1 G/DL (ref 12–15.9)
IMM GRANULOCYTES # BLD AUTO: 0.05 10*3/MM3 (ref 0–0.05)
IMM GRANULOCYTES NFR BLD AUTO: 0.9 % (ref 0–0.5)
LYMPHOCYTES # BLD AUTO: 1.03 10*3/MM3 (ref 0.7–3.1)
LYMPHOCYTES NFR BLD AUTO: 18.1 % (ref 19.6–45.3)
MCH RBC QN AUTO: 24.7 PG (ref 26.6–33)
MCHC RBC AUTO-ENTMCNC: 31.7 G/DL (ref 31.5–35.7)
MCV RBC AUTO: 78 FL (ref 79–97)
MONOCYTES # BLD AUTO: 0.34 10*3/MM3 (ref 0.1–0.9)
MONOCYTES NFR BLD AUTO: 6 % (ref 5–12)
NEUTROPHILS # BLD AUTO: 4.15 10*3/MM3 (ref 1.7–7)
NEUTROPHILS NFR BLD AUTO: 72.7 % (ref 42.7–76)
NRBC BLD AUTO-RTO: 0 /100 WBC (ref 0–0.2)
PLATELET # BLD AUTO: 114 10*3/MM3 (ref 140–450)
POTASSIUM SERPL-SCNC: 3.6 MMOL/L (ref 3.5–5.2)
PROT SERPL-MCNC: 6.7 G/DL (ref 6–8.5)
RBC # BLD AUTO: 3.68 10*6/MM3 (ref 3.77–5.28)
SODIUM SERPL-SCNC: 141 MMOL/L (ref 136–145)
WBC # BLD AUTO: 5.7 10*3/MM3 (ref 3.4–10.8)

## 2023-04-07 ENCOUNTER — TELEPHONE (OUTPATIENT)
Dept: GASTROENTEROLOGY | Facility: CLINIC | Age: 78
End: 2023-04-07
Payer: MEDICARE

## 2023-04-07 NOTE — TELEPHONE ENCOUNTER
----- Message from EARNEST Freire sent at 4/7/2023  7:43 AM EDT -----  Labs continue to show anemia but it is stable, continue her iron and B12.  Vitamin D is normal, continue with supplementation.  Creatinine at 1.5.  Liver stable

## 2023-07-05 ENCOUNTER — TELEPHONE (OUTPATIENT)
Dept: GASTROENTEROLOGY | Facility: CLINIC | Age: 78
End: 2023-07-05

## 2023-07-05 PROBLEM — F41.8 MIXED ANXIETY DEPRESSIVE DISORDER: Status: ACTIVE | Noted: 2020-08-24

## 2023-07-05 PROBLEM — R41.3 AMNESIA: Status: ACTIVE | Noted: 2021-12-02

## 2023-07-05 PROBLEM — G25.81 RESTLESS LEGS SYNDROME: Status: ACTIVE | Noted: 2021-12-02

## 2023-07-05 PROBLEM — K50.90 CROHN DISEASE: Chronic | Status: ACTIVE | Noted: 2023-05-08

## 2023-07-05 PROBLEM — I48.0 PAROXYSMAL ATRIAL FIBRILLATION: Chronic | Status: ACTIVE | Noted: 2023-03-13

## 2023-07-05 PROBLEM — N18.9 CHRONIC KIDNEY DISEASE: Status: ACTIVE | Noted: 2023-03-12

## 2023-07-05 PROBLEM — N18.32 STAGE 3B CHRONIC KIDNEY DISEASE: Chronic | Status: ACTIVE | Noted: 2023-05-08

## 2023-07-05 PROBLEM — E53.8 COBALAMIN DEFICIENCY: Status: ACTIVE | Noted: 2021-02-23

## 2023-07-05 PROBLEM — G62.9 NEUROPATHY: Status: ACTIVE | Noted: 2020-08-24

## 2023-07-05 PROBLEM — E11.9 DIABETES MELLITUS DUE TO INSULIN RECEPTOR ANTIBODIES: Chronic | Status: ACTIVE | Noted: 2023-05-08

## 2023-07-05 NOTE — TELEPHONE ENCOUNTER
Caller: Yenny Atkins    Relationship to patient: Self    Best call back number: [phone]    Patient is needing: CALLING BACK A MISSED CALL FROM TODAY. PT SAID IT IS OKAY TO LEAVE A VOICEMAIL

## 2023-07-25 ENCOUNTER — HOSPITAL ENCOUNTER (OUTPATIENT)
Dept: ULTRASOUND IMAGING | Facility: HOSPITAL | Age: 78
Discharge: HOME | End: 2023-07-25
Admitting: NURSE PRACTITIONER
Payer: MEDICARE

## 2023-07-25 DIAGNOSIS — K74.60 LIVER CIRRHOSIS SECONDARY TO NASH: ICD-10-CM

## 2023-07-25 DIAGNOSIS — K75.81 LIVER CIRRHOSIS SECONDARY TO NASH: ICD-10-CM

## 2023-07-25 PROCEDURE — 76705 ECHO EXAM OF ABDOMEN: CPT

## 2023-08-20 ENCOUNTER — APPOINTMENT (OUTPATIENT)
Dept: GENERAL RADIOLOGY | Facility: HOSPITAL | Age: 78
DRG: 194 | End: 2023-08-20
Payer: MEDICARE

## 2023-08-20 ENCOUNTER — HOSPITAL ENCOUNTER (INPATIENT)
Facility: HOSPITAL | Age: 78
LOS: 2 days | Discharge: HOME | DRG: 194 | End: 2023-08-22
Attending: EMERGENCY MEDICINE | Admitting: INTERNAL MEDICINE
Payer: MEDICARE

## 2023-08-20 DIAGNOSIS — S16.1XXA STRAIN OF NECK MUSCLE, INITIAL ENCOUNTER: ICD-10-CM

## 2023-08-20 DIAGNOSIS — N28.9 ACUTE RENAL INSUFFICIENCY: ICD-10-CM

## 2023-08-20 DIAGNOSIS — Z79.01 CHRONIC ANTICOAGULATION: ICD-10-CM

## 2023-08-20 DIAGNOSIS — R50.9 LOW GRADE FEVER: ICD-10-CM

## 2023-08-20 DIAGNOSIS — R06.00 DYSPNEA, UNSPECIFIED TYPE: ICD-10-CM

## 2023-08-20 DIAGNOSIS — Z86.2 HISTORY OF ANEMIA: ICD-10-CM

## 2023-08-20 DIAGNOSIS — J18.9 COMMUNITY ACQUIRED PNEUMONIA OF RIGHT LOWER LOBE OF LUNG: Primary | ICD-10-CM

## 2023-08-20 DIAGNOSIS — K75.81 NASH (NONALCOHOLIC STEATOHEPATITIS): ICD-10-CM

## 2023-08-20 DIAGNOSIS — R05.8 PRODUCTIVE COUGH: ICD-10-CM

## 2023-08-20 DIAGNOSIS — E87.6 HYPOKALEMIA: ICD-10-CM

## 2023-08-20 DIAGNOSIS — Z87.19 HISTORY OF CROHN'S DISEASE: ICD-10-CM

## 2023-08-20 DIAGNOSIS — Z86.79 HISTORY OF HYPERTENSION: ICD-10-CM

## 2023-08-20 DIAGNOSIS — Z86.39 HISTORY OF DIABETES MELLITUS: ICD-10-CM

## 2023-08-20 DIAGNOSIS — I48.91 ATRIAL FIBRILLATION WITH RAPID VENTRICULAR RESPONSE: ICD-10-CM

## 2023-08-20 LAB
ALBUMIN SERPL-MCNC: 3.5 G/DL (ref 3.5–5.2)
ALBUMIN/GLOB SERPL: 0.8 G/DL
ALP SERPL-CCNC: 66 U/L (ref 39–117)
ALT SERPL W P-5'-P-CCNC: 11 U/L (ref 1–33)
ANION GAP SERPL CALCULATED.3IONS-SCNC: 15 MMOL/L (ref 5–15)
AST SERPL-CCNC: 24 U/L (ref 1–32)
B PARAPERT DNA SPEC QL NAA+PROBE: NOT DETECTED
B PERT DNA SPEC QL NAA+PROBE: NOT DETECTED
BASOPHILS # BLD AUTO: 0.03 10*3/MM3 (ref 0–0.2)
BASOPHILS NFR BLD AUTO: 0.3 % (ref 0–1.5)
BILIRUB SERPL-MCNC: 1 MG/DL (ref 0–1.2)
BUN SERPL-MCNC: 32 MG/DL (ref 8–23)
BUN/CREAT SERPL: 20.8 (ref 7–25)
C PNEUM DNA NPH QL NAA+NON-PROBE: NOT DETECTED
CALCIUM SPEC-SCNC: 9.2 MG/DL (ref 8.6–10.5)
CHLORIDE SERPL-SCNC: 100 MMOL/L (ref 98–107)
CO2 SERPL-SCNC: 22 MMOL/L (ref 22–29)
CREAT SERPL-MCNC: 1.54 MG/DL (ref 0.57–1)
CRP SERPL-MCNC: 10.98 MG/DL (ref 0–0.5)
D-LACTATE SERPL-SCNC: 1.6 MMOL/L (ref 0.5–2)
DEPRECATED RDW RBC AUTO: 42.4 FL (ref 37–54)
EGFRCR SERPLBLD CKD-EPI 2021: 34 ML/MIN/1.73
EOSINOPHIL # BLD AUTO: 0.15 10*3/MM3 (ref 0–0.4)
EOSINOPHIL NFR BLD AUTO: 1.7 % (ref 0.3–6.2)
ERYTHROCYTE [DISTWIDTH] IN BLOOD BY AUTOMATED COUNT: 14.4 % (ref 12.3–15.4)
FLUAV SUBTYP SPEC NAA+PROBE: NOT DETECTED
FLUBV RNA ISLT QL NAA+PROBE: NOT DETECTED
GLOBULIN UR ELPH-MCNC: 4.2 GM/DL
GLUCOSE SERPL-MCNC: 215 MG/DL (ref 65–99)
HADV DNA SPEC NAA+PROBE: NOT DETECTED
HCOV 229E RNA SPEC QL NAA+PROBE: NOT DETECTED
HCOV HKU1 RNA SPEC QL NAA+PROBE: NOT DETECTED
HCOV NL63 RNA SPEC QL NAA+PROBE: NOT DETECTED
HCOV OC43 RNA SPEC QL NAA+PROBE: NOT DETECTED
HCT VFR BLD AUTO: 32.3 % (ref 34–46.6)
HGB BLD-MCNC: 9.7 G/DL (ref 12–15.9)
HMPV RNA NPH QL NAA+NON-PROBE: NOT DETECTED
HOLD SPECIMEN: NORMAL
HPIV1 RNA ISLT QL NAA+PROBE: NOT DETECTED
HPIV2 RNA SPEC QL NAA+PROBE: NOT DETECTED
HPIV3 RNA NPH QL NAA+PROBE: NOT DETECTED
HPIV4 P GENE NPH QL NAA+PROBE: NOT DETECTED
IMM GRANULOCYTES # BLD AUTO: 0.03 10*3/MM3 (ref 0–0.05)
IMM GRANULOCYTES NFR BLD AUTO: 0.3 % (ref 0–0.5)
LDH SERPL-CCNC: 249 U/L (ref 135–214)
LYMPHOCYTES # BLD AUTO: 0.85 10*3/MM3 (ref 0.7–3.1)
LYMPHOCYTES NFR BLD AUTO: 9.6 % (ref 19.6–45.3)
M PNEUMO IGG SER IA-ACNC: NOT DETECTED
MAGNESIUM SERPL-MCNC: 1.6 MG/DL (ref 1.6–2.4)
MCH RBC QN AUTO: 24.1 PG (ref 26.6–33)
MCHC RBC AUTO-ENTMCNC: 30 G/DL (ref 31.5–35.7)
MCV RBC AUTO: 80.1 FL (ref 79–97)
MONOCYTES # BLD AUTO: 0.54 10*3/MM3 (ref 0.1–0.9)
MONOCYTES NFR BLD AUTO: 6.1 % (ref 5–12)
NEUTROPHILS NFR BLD AUTO: 7.22 10*3/MM3 (ref 1.7–7)
NEUTROPHILS NFR BLD AUTO: 82 % (ref 42.7–76)
NRBC BLD AUTO-RTO: 0 /100 WBC (ref 0–0.2)
NT-PROBNP SERPL-MCNC: 4593 PG/ML (ref 0–1800)
PLATELET # BLD AUTO: 188 10*3/MM3 (ref 140–450)
PMV BLD AUTO: 12 FL (ref 6–12)
POTASSIUM SERPL-SCNC: 2.9 MMOL/L (ref 3.5–5.2)
PROCALCITONIN SERPL-MCNC: 0.15 NG/ML (ref 0–0.25)
PROT SERPL-MCNC: 7.7 G/DL (ref 6–8.5)
RBC # BLD AUTO: 4.03 10*6/MM3 (ref 3.77–5.28)
RHINOVIRUS RNA SPEC NAA+PROBE: NOT DETECTED
RSV RNA NPH QL NAA+NON-PROBE: NOT DETECTED
SARS-COV-2 RNA NPH QL NAA+NON-PROBE: NOT DETECTED
SODIUM SERPL-SCNC: 137 MMOL/L (ref 136–145)
TROPONIN T SERPL HS-MCNC: 21 NG/L
TSH SERPL DL<=0.05 MIU/L-ACNC: 2.69 UIU/ML (ref 0.27–4.2)
WBC NRBC COR # BLD: 8.82 10*3/MM3 (ref 3.4–10.8)
WHOLE BLOOD HOLD COAG: NORMAL
WHOLE BLOOD HOLD SPECIMEN: NORMAL

## 2023-08-20 PROCEDURE — 36415 COLL VENOUS BLD VENIPUNCTURE: CPT

## 2023-08-20 PROCEDURE — 25010000002 CEFTRIAXONE PER 250 MG: Performed by: PHYSICIAN ASSISTANT

## 2023-08-20 PROCEDURE — 85025 COMPLETE CBC W/AUTO DIFF WBC: CPT | Performed by: EMERGENCY MEDICINE

## 2023-08-20 PROCEDURE — 99285 EMERGENCY DEPT VISIT HI MDM: CPT

## 2023-08-20 PROCEDURE — 83735 ASSAY OF MAGNESIUM: CPT | Performed by: EMERGENCY MEDICINE

## 2023-08-20 PROCEDURE — 80053 COMPREHEN METABOLIC PANEL: CPT | Performed by: EMERGENCY MEDICINE

## 2023-08-20 PROCEDURE — 83615 LACTATE (LD) (LDH) ENZYME: CPT | Performed by: PHYSICIAN ASSISTANT

## 2023-08-20 PROCEDURE — 83605 ASSAY OF LACTIC ACID: CPT | Performed by: PHYSICIAN ASSISTANT

## 2023-08-20 PROCEDURE — 87040 BLOOD CULTURE FOR BACTERIA: CPT | Performed by: PHYSICIAN ASSISTANT

## 2023-08-20 PROCEDURE — 83880 ASSAY OF NATRIURETIC PEPTIDE: CPT | Performed by: EMERGENCY MEDICINE

## 2023-08-20 PROCEDURE — 71045 X-RAY EXAM CHEST 1 VIEW: CPT

## 2023-08-20 PROCEDURE — 84443 ASSAY THYROID STIM HORMONE: CPT | Performed by: EMERGENCY MEDICINE

## 2023-08-20 PROCEDURE — 0202U NFCT DS 22 TRGT SARS-COV-2: CPT | Performed by: PHYSICIAN ASSISTANT

## 2023-08-20 PROCEDURE — 99223 1ST HOSP IP/OBS HIGH 75: CPT | Performed by: INTERNAL MEDICINE

## 2023-08-20 PROCEDURE — 84484 ASSAY OF TROPONIN QUANT: CPT | Performed by: EMERGENCY MEDICINE

## 2023-08-20 PROCEDURE — 84145 PROCALCITONIN (PCT): CPT | Performed by: PHYSICIAN ASSISTANT

## 2023-08-20 PROCEDURE — 93005 ELECTROCARDIOGRAM TRACING: CPT | Performed by: EMERGENCY MEDICINE

## 2023-08-20 PROCEDURE — 86140 C-REACTIVE PROTEIN: CPT | Performed by: PHYSICIAN ASSISTANT

## 2023-08-20 PROCEDURE — 84484 ASSAY OF TROPONIN QUANT: CPT | Performed by: PHYSICIAN ASSISTANT

## 2023-08-20 PROCEDURE — 93005 ELECTROCARDIOGRAM TRACING: CPT | Performed by: PHYSICIAN ASSISTANT

## 2023-08-20 RX ORDER — SODIUM CHLORIDE 0.9 % (FLUSH) 0.9 %
10 SYRINGE (ML) INJECTION AS NEEDED
Status: DISCONTINUED | OUTPATIENT
Start: 2023-08-20 | End: 2023-08-22 | Stop reason: HOSPADM

## 2023-08-20 RX ORDER — METOPROLOL TARTRATE 5 MG/5ML
5 INJECTION INTRAVENOUS ONCE
Status: COMPLETED | OUTPATIENT
Start: 2023-08-20 | End: 2023-08-20

## 2023-08-20 RX ORDER — POTASSIUM CHLORIDE 750 MG/1
40 CAPSULE, EXTENDED RELEASE ORAL ONCE
Status: COMPLETED | OUTPATIENT
Start: 2023-08-20 | End: 2023-08-20

## 2023-08-20 RX ORDER — AMLODIPINE BESYLATE 5 MG/1
5 TABLET ORAL DAILY
Status: CANCELLED | OUTPATIENT
Start: 2023-08-21

## 2023-08-20 RX ADMIN — SODIUM CHLORIDE 500 ML: 9 INJECTION, SOLUTION INTRAVENOUS at 19:45

## 2023-08-20 RX ADMIN — METOPROLOL TARTRATE 5 MG: 5 INJECTION INTRAVENOUS at 19:45

## 2023-08-20 RX ADMIN — POTASSIUM CHLORIDE 40 MEQ: 750 CAPSULE, EXTENDED RELEASE ORAL at 22:46

## 2023-08-20 RX ADMIN — SODIUM CHLORIDE 1000 MG: 900 INJECTION INTRAVENOUS at 22:46

## 2023-08-20 NOTE — ED PROVIDER NOTES
Subjective   History of Present Illness  Is a 78-year-old female that presents the ER with recurrent palpitations, shortness of breath, and chest pressure for the last 2 weeks.  Patient has been seen at the ER at [facility] x2 on 8/11/2023 and 8/13/2023 and discharged to home.  She was diagnosed with right lower lobe pneumonia and prescribed Augmentin and doxycycline on 8/13/2023, and during both ER evaluations, she was given IV diuretics and medications for rate control of chronic atrial fibrillation.  She is on Xarelto, Lasix 40 mg by mouth daily, and Toprol-XL.  Patient reports continuing to feel poorly.  She continues to have chest tightness, shortness of breath, and she reports productive cough with green sputum.  She reports low-grade fever with Tmax around 101.  She has been taking Augmentin and doxycycline without much relief.  She denies any history of electrical cardioversion.  She says that she has been in permanent atrial fibrillation and only utilize this medication.  She follows with cardiology at [facility], Miladis Reeves.  Patient denies any increase in lower extremity pedal edema.  Past medical history is also significant for hypertension, diabetes mellitus, peripheral neuropathy, restless leg syndrome, hypothyroidism, CASE, chronic atrial fibrillation, GERD, Crohn's disease.    History provided by:  Patient  Palpitations  Palpitations quality:  Irregular  Duration:  2 weeks  Timing:  Constant  Progression:  Unchanged  Chronicity:  Recurrent  Context comment:  History of chronic atrial fibrillation, on Xarelto.  She has been having chest pressure, SOA, palpitations, recent dx of RLL pneumonia at [facility] on 8/13/23.  On Augmentin and Doxy.  Scheduled for cardiac ablation next Friday.  Feels worse  Relieved by:  Nothing  Worsened by:  Nothing  Ineffective treatments: Pt on Xarelto, Lasix, Augmentin/Doxycycline.  Associated symptoms: chest pressure, cough (Producrtive green cough), dizziness,  "lower extremity edema (Improved after recent IV diuretics during recent ER visits at [facility] on 8/11/23 and 8/13/23.), malaise/fatigue, nausea, orthopnea, shortness of breath and weakness    Associated symptoms: no back pain, no chest pain, no numbness, no syncope and no vomiting    Risk factors: hx of atrial fibrillation    Risk factors comment:  Pt follows with routine cardiologist, Miladis Reeves through [facility]. She is scheduled for cardiac ablation next Friday.    Review of Systems   Constitutional:  Positive for activity change, appetite change, fatigue, fever (Tmax 101) and malaise/fatigue.   HENT:  Positive for congestion. Negative for ear pain, postnasal drip, rhinorrhea, sinus pressure, sinus pain, sneezing and sore throat.    Respiratory:  Positive for cough (Producrtive green cough), chest tightness and shortness of breath.    Cardiovascular:  Positive for palpitations and orthopnea. Negative for chest pain, leg swelling and syncope.   Gastrointestinal:  Positive for nausea. Negative for abdominal pain and vomiting.   Genitourinary: Negative.  Negative for dysuria, flank pain, frequency and urgency.   Musculoskeletal: Negative.  Negative for back pain.   Neurological:  Positive for dizziness and weakness. Negative for numbness.   All other systems reviewed and are negative.    Past Medical History:   Diagnosis Date    Atrial fibrillation     Congenital hyperrotation     Crohn disease     Fatty liver     GERD (gastroesophageal reflux disease)     Hypertension     Hypothyroidism     Neuropathy     Non-alcoholic cirrhosis     Overweight (BMI 25.0-29.9)     Restless leg syndrome     Type 2 diabetes mellitus        Allergies   Allergen Reactions    Diltiazem Shortness Of Breath    Levaquin [Levofloxacin] Anaphylaxis     Couldn't breath    Promethazine Other (See Comments)     \"feels funny\"    Promethazine Hcl Mental Status Change    Tylenol [Acetaminophen] Other (See Comments)     Feels crazy when taking " tylenol       Past Surgical History:   Procedure Laterality Date    COLON RESECTION      [facility]    [facility]COPY  08/08/2019    DR CONTR[facility]AS [facility]    COLONOSCOPY N/A 11/1/2022    Procedure: COLONOSCOPY;  Surgeon: Harry Gooden MD;  Location:  [facility];  Service: Gastroenterology;  Laterality: N/A;    ENDOSCOPY N/A 11/1/2022    Procedure: ESOPHAGOGASTRODUODENOSCOPY;  Surgeon: Harry Gooden MD;  Location:  [facility];  Service: Gastroenterology;  Laterality: N/A;    KNEE SURGERY Right     TWICE    UPPER GASTROINTESTINAL ENDOSCOPY         Family History   Problem Relation Age of Onset    Colon cancer Father     Colon cancer Paternal Grandfather     Esophageal cancer Son     Hypertension Mother     Stroke Mother     Diabetes Sister     Cancer Brother     Diabetes Brother        Social History     Socioeconomic History    Marital status:    Tobacco Use    Smoking status: Never    Smokeless tobacco: Never   Vaping Use    Vaping Use: Never used   Substance and Sexual Activity    Alcohol use: No    Drug use: No    Sexual activity: Defer           Objective   Physical Exam  Vitals and nursing note reviewed.   Constitutional:       General: She is not in acute distress.     Appearance: Normal appearance. She is ill-appearing. She is not toxic-appearing or diaphoretic.      Comments: Patient appears mildly ill.  No acute distress.  Nontoxic.   HENT:      Head: Normocephalic and atraumatic.      Nose: Nose normal.      Mouth/Throat:      Mouth: Mucous membranes are dry.      Pharynx: Oropharynx is clear.      Comments: Oral mucous membranes are dry.  Posterior pharynx is nonerythematous.  Eyes:      Extraocular Movements: Extraocular movements intact.      Conjunctiva/sclera: Conjunctivae normal.      Pupils: Pupils are equal, round, and reactive to light.   Cardiovascular:      Rate and Rhythm: Tachycardia present. Rhythm irregularly irregular.       Pulses: Normal pulses.      Heart sounds: Normal heart sounds.      Comments: Tachycardia with a rate in the 130s.  Rhythm is irregularly irregular with chronic atrial fibrillation.  No pedal edema to lower extremities.  Pulmonary:      Effort: Pulmonary effort is normal. No tachypnea, accessory muscle usage or retractions.      Breath sounds: Examination of the right-lower field reveals decreased breath sounds. Decreased breath sounds present. No wheezing, rhonchi or rales.      Comments: No tachypnea, retractions, or accessory muscle use.  Decreased breath sounds to the right lower lobe.  No wheezes, rhonchi, or rales.  No respiratory distress.  Abdominal:      General: Bowel sounds are normal.      Palpations: Abdomen is soft.   Musculoskeletal:         General: Normal range of motion.      Cervical back: Normal range of motion and neck supple.      Right lower leg: No edema.      Left lower leg: No edema.   Skin:     General: Skin is warm and dry.   Neurological:      General: No focal deficit present.      Mental Status: She is alert.      Cranial Nerves: Cranial nerves 2-12 are intact.      Sensory: Sensation is intact.      Motor: Weakness present.      Coordination: Coordination is intact.      Comments: Generally weak with overall functional decline.  No focal deficits.       Procedures           ED Course  ED Course as of 08/20/23 2311   Sun Aug 20, 2023   2306 EKGs x2 show atrial fibrillation with rapid ventricular response with a rate in the 110s.  Patient is in chronic atrial fibrillation.  CBC reveals normal white blood cell count at 8.82 with stable anemia with H&H of 9.7 and 32.  Differential shows 82% neutrophils.  Chemistries reveal serum glucose 215, BUN and creatinine was 32 and 1.54.  Last creatinine 1 month ago was 1.22.  Potassium is decreased at 2.9.  Respiratory PCR panel is completely negative.  High-sensitivity troponin is 21.  Lactic acid is 1.6.  BNP is 4593 and LDH is 249 and  procalcitonin is normal.  CRP is 10.98.  Chest x-ray reveals right lower lobe pneumonia.  O2 sat has been around 94 to 95% throughout the ER course.  At times, when I have been in the room, sats will drop to around 88% on room air and then stabilized around 91% on room air.  I gave a dose of IV Lopressor 5 mg and heart rate is around 101.  I also ordered KCl 40 mEq by mouth as well as Rocephin and doxycycline.  Discussed admission with hospitalist, Dr. Carvajal, and she is agreeable to admission on telemetry.  I updated patient and family members at the bedside on all results and need for admission and they are very appreciative.  Vital signs are stable. [FC]   2308 CHADS2 Vasc score is 4. [FC]      ED Course User Index  [FC] Courtney Russ, MADELINE           Recent Results (from the past 24 hour(s))   ECG 12 Lead ED Triage Standing Order; Dysrhythmia    Collection Time: 08/20/23  7:04 PM   Result Value Ref Range    QT Interval 350 ms    QTC Interval 458 ms   Comprehensive Metabolic Panel    Collection Time: 08/20/23  7:27 PM    Specimen: Blood   Result Value Ref Range    Glucose 215 (H) 65 - 99 mg/dL    BUN 32 (H) 8 - 23 mg/dL    Creatinine 1.54 (H) 0.57 - 1.00 mg/dL    Sodium 137 136 - 145 mmol/L    Potassium 2.9 (L) 3.5 - 5.2 mmol/L    Chloride 100 98 - 107 mmol/L    CO2 22.0 22.0 - 29.0 mmol/L    Calcium 9.2 8.6 - 10.5 mg/dL    Total Protein 7.7 6.0 - 8.5 g/dL    Albumin 3.5 3.5 - 5.2 g/dL    ALT (SGPT) 11 1 - 33 U/L    AST (SGOT) 24 1 - 32 U/L    Alkaline Phosphatase 66 39 - 117 U/L    Total Bilirubin 1.0 0.0 - 1.2 mg/dL    Globulin 4.2 gm/dL    A/G Ratio 0.8 g/dL    BUN/Creatinine Ratio 20.8 7.0 - 25.0    Anion Gap 15.0 5.0 - 15.0 mmol/L    eGFR 34.0 (L) >60.0 mL/min/1.73   Magnesium    Collection Time: 08/20/23  7:27 PM    Specimen: Blood   Result Value Ref Range    Magnesium 1.6 1.6 - 2.4 mg/dL   Single High Sensitivity Troponin T    Collection Time: 08/20/23  7:27 PM    Specimen: Blood   Result Value Ref Range     HS Troponin T 21 (H) <10 ng/L   TSH    Collection Time: 08/20/23  7:27 PM    Specimen: Blood   Result Value Ref Range    TSH 2.690 0.270 - 4.200 uIU/mL   BNP    Collection Time: 08/20/23  7:27 PM    Specimen: Blood   Result Value Ref Range    proBNP 4,593.0 (H) 0.0 - 1,800.0 pg/mL   Green Top (Gel)    Collection Time: 08/20/23  7:27 PM   Result Value Ref Range    Extra Tube Hold for add-ons.    Lavender Top    Collection Time: 08/20/23  7:27 PM   Result Value Ref Range    Extra Tube hold for add-on    Gold Top - SST    Collection Time: 08/20/23  7:27 PM   Result Value Ref Range    Extra Tube Hold for add-ons.    Light Blue Top    Collection Time: 08/20/23  7:27 PM   Result Value Ref Range    Extra Tube Hold for add-ons.    CBC Auto Differential    Collection Time: 08/20/23  7:27 PM    Specimen: Blood   Result Value Ref Range    WBC 8.82 3.40 - 10.80 10*3/mm3    RBC 4.03 3.77 - 5.28 10*6/mm3    Hemoglobin 9.7 (L) 12.0 - 15.9 g/dL    Hematocrit 32.3 (L) 34.0 - 46.6 %    MCV 80.1 79.0 - 97.0 fL    MCH 24.1 (L) 26.6 - 33.0 pg    MCHC 30.0 (L) 31.5 - 35.7 g/dL    RDW 14.4 12.3 - 15.4 %    RDW-SD 42.4 37.0 - 54.0 fl    MPV 12.0 6.0 - 12.0 fL    Platelets 188 140 - 450 10*3/mm3    Neutrophil % 82.0 (H) 42.7 - 76.0 %    Lymphocyte % 9.6 (L) 19.6 - 45.3 %    Monocyte % 6.1 5.0 - 12.0 %    Eosinophil % 1.7 0.3 - 6.2 %    Basophil % 0.3 0.0 - 1.5 %    Immature Grans % 0.3 0.0 - 0.5 %    Neutrophils, Absolute 7.22 (H) 1.70 - 7.00 10*3/mm3    Lymphocytes, Absolute 0.85 0.70 - 3.10 10*3/mm3    Monocytes, Absolute 0.54 0.10 - 0.90 10*3/mm3    Eosinophils, Absolute 0.15 0.00 - 0.40 10*3/mm3    Basophils, Absolute 0.03 0.00 - 0.20 10*3/mm3    Immature Grans, Absolute 0.03 0.00 - 0.05 10*3/mm3    nRBC 0.0 0.0 - 0.2 /100 WBC   Lactate Dehydrogenase    Collection Time: 08/20/23  7:27 PM    Specimen: Blood   Result Value Ref Range     (H) 135 - 214 U/L   Procalcitonin    Collection Time: 08/20/23  7:27 PM    Specimen: Blood    Result Value Ref Range    Procalcitonin 0.15 0.00 - 0.25 ng/mL   Lactic Acid, Plasma    Collection Time: 08/20/23  7:27 PM    Specimen: Blood   Result Value Ref Range    Lactate 1.6 0.5 - 2.0 mmol/L   C-reactive Protein    Collection Time: 08/20/23  7:27 PM    Specimen: Blood   Result Value Ref Range    C-Reactive Protein 10.98 (H) 0.00 - 0.50 mg/dL   Respiratory Panel PCR w/COVID-19(SARS-CoV-2) [facility] In-House, NP Swab in UTM/VTM, 3-4 HR TAT - Swab, Nasopharynx    Collection Time: 08/20/23  7:45 PM    Specimen: Nasopharynx; Swab   Result Value Ref Range    ADENOVIRUS, PCR Not Detected Not Detected    Coronavirus 229E Not Detected Not Detected    Coronavirus HKU1 Not Detected Not Detected    Coronavirus NL63 Not Detected Not Detected    Coronavirus OC43 Not Detected Not Detected    COVID19 Not Detected Not Detected - Ref. Range    Human Metapneumovirus Not Detected Not Detected    Human Rhinovirus/Enterovirus Not Detected Not Detected    Influenza A PCR Not Detected Not Detected    Influenza B PCR Not Detected Not Detected    Parainfluenza Virus 1 Not Detected Not Detected    Parainfluenza Virus 2 Not Detected Not Detected    Parainfluenza Virus 3 Not Detected Not Detected    Parainfluenza Virus 4 Not Detected Not Detected    RSV, PCR Not Detected Not Detected    Bordetella pertussis pcr Not Detected Not Detected    Bordetella parapertussis PCR Not Detected Not Detected    Chlamydophila pneumoniae PCR Not Detected Not Detected    Mycoplasma pneumo by PCR Not Detected Not Detected   ECG 12 Lead Dyspnea    Collection Time: 08/20/23 10:04 PM   Result Value Ref Range    QT Interval 340 ms    QTC Interval 476 ms     Note: In addition to lab results from this visit, the labs listed above may include labs taken at another facility or during a different encounter within the last 24 hours. Please correlate lab times with ED admission and discharge times for further clarification of the services  performed during this visit.    XR Chest 1 View   Final Result   Impression:   Right lower lobe pneumonia.         Electronically Signed: Alireza Hanson MD     8/20/2023 6:40 PM CDT     Workstation ID: [id number]        Vitals:    08/20/23 2100 08/20/23 2115 08/20/23 2130 08/20/23 2215   BP: 114/71 118/68 114/55 118/82   BP Location:       Patient Position:       Pulse: 97 101 103 101   Resp:       Temp:       TempSrc:       SpO2: 90% 95% 97% 91%   Weight:       Height:         Medications   sodium chloride 0.9 % flush 10 mL (has no administration in time range)   cefTRIAXone (ROCEPHIN) 1000 mg/100 mL 0.9% NS (MBP) (1,000 mg Intravenous New Bag 8/20/23 2246)   doxycycline (VIBRAMYCIN) 100 mg in sodium chloride 0.9 % 100 mL IVPB-VTB (has no administration in time range)   Magnesium Low Dose Replacement - Follow Nurse / BPA Driven Protocol (has no administration in time range)   sodium chloride 0.9 % bolus 500 mL (0 mL Intravenous Stopped 8/20/23 2039)   metoprolol tartrate (LOPRESSOR) injection 5 mg (5 mg Intravenous Given 8/20/23 1945)   potassium chloride (MICRO-K) CR capsule 40 mEq (40 mEq Oral Given 8/20/23 2246)     ECG/EMG Results (last 24 hours)       Procedure Component Value Units Date/Time    ECG 12 Lead ED Triage Standing Order; Dysrhythmia [[id number]] Collected: 08/20/23 1904     Updated: 08/20/23 1904     QT Interval 350 ms      QTC Interval 458 ms     Narrative:      Test Reason : ED Triage Standing Order~  Blood Pressure :   */*   mmHG  Vent. Rate : 103 BPM     Atrial Rate : 129 BPM     P-R Int :   * ms          QRS Dur :  92 ms      QT Int : 350 ms       P-R-T Axes :   * -39  81 degrees     QTc Int : 458 ms    Atrial fibrillation with rapid ventricular response with premature ventricular or aberrantly conducted complexes  Left axis deviation  Septal infarct , age undetermined  Abnormal ECG  When compared with ECG of 12-JUL-2018 20:15,  Atrial fibrillation has replaced Sinus rhythm    Referred By:             Confirmed By:           ECG 12 Lead Dyspnea   Preliminary Result   Test Reason : Dyspnea   Blood Pressure :   */*   mmHG   Vent. Rate : 118 BPM     Atrial Rate : 117 BPM      P-R Int :   * ms          QRS Dur :  90 ms       QT Int : 340 ms       P-R-T Axes :   * -63  83 degrees      QTc Int : 476 ms      Atrial fibrillation with rapid ventricular response   Left axis deviation   Anteroseptal infarct (cited on or before 20-AUG-2023)   Abnormal ECG   When compared with ECG of 20-AUG-2023 19:04, (Unconfirmed)   Questionable change in initial forces of Anterior leads   Nonspecific T wave abnormality no longer evident in Lateral leads      Referred By: EDMD           Confirmed By:       ECG 12 Lead ED Triage Standing Order; Dysrhythmia   Preliminary Result   Test Reason : ED Triage Standing Order~   Blood Pressure :   */*   mmHG   Vent. Rate : 103 BPM     Atrial Rate : 129 BPM      P-R Int :   * ms          QRS Dur :  92 ms       QT Int : 350 ms       P-R-T Axes :   * -39  81 degrees      QTc Int : 458 ms      Atrial fibrillation with rapid ventricular response with premature    ventricular or aberrantly conducted complexes   Left axis deviation   Septal infarct , age undetermined   Abnormal ECG   When compared with ECG of 12-JUL-2018 20:15,   Atrial fibrillation has replaced Sinus rhythm      Referred By:            Confirmed By:             CHADS? Score for Atrial Fibrillation Stroke Risk - MDCalc  Calculated on Aug 20 2023 8:34 PM  4 points -> High risk of thromboembolic event. 8.5% risk of event per year if no coumadin. The adjusted stroke rate was the expected stroke rate per 100 person-years derived from the multivariable model assuming that aspirin was not taken.                                Medical Decision Making  Amount and/or Complexity of Data Reviewed  Labs: ordered.  Radiology: ordered.  ECG/medicine tests: ordered.    Risk  Prescription drug management.  Decision regarding  hospitalization.        Final diagnoses:   Community acquired pneumonia of right lower lobe of lung   Dyspnea, unspecified type   Atrial fibrillation with rapid ventricular response   Productive cough   Low grade fever   Chronic anticoagulation   Acute renal insufficiency   Hypokalemia   History of anemia   History of hypertension   History of diabetes mellitus   History of Crohn's disease   CASE (nonalcoholic steatohepatitis)       ED Disposition  ED Disposition       ED Disposition   Decision to Admit    Condition   --    Comment   Level of Care: Telemetry [5]   Diagnosis: A-fib [889065]   Admitting Physician: FREDDY CONTE [[id number]]   Attending Physician: FREDDY CONTE [[id number]]   Certification: I Certify That Inpatient Hospital Services Are Medically Necessary For Greater Than 2 Midnights                 No follow-up provider specified.       Medication List      No changes were made to your prescriptions during this visit.            Courtney Russ PA-C  08/20/23 4405

## 2023-08-21 LAB
ANION GAP SERPL CALCULATED.3IONS-SCNC: 12 MMOL/L (ref 5–15)
BASOPHILS # BLD AUTO: 0.02 10*3/MM3 (ref 0–0.2)
BASOPHILS NFR BLD AUTO: 0.3 % (ref 0–1.5)
BUN SERPL-MCNC: 30 MG/DL (ref 8–23)
BUN/CREAT SERPL: 24.8 (ref 7–25)
CALCIUM SPEC-SCNC: 8.6 MG/DL (ref 8.6–10.5)
CHLORIDE SERPL-SCNC: 104 MMOL/L (ref 98–107)
CO2 SERPL-SCNC: 23 MMOL/L (ref 22–29)
CREAT SERPL-MCNC: 1.21 MG/DL (ref 0.57–1)
DEPRECATED RDW RBC AUTO: 40.8 FL (ref 37–54)
EGFRCR SERPLBLD CKD-EPI 2021: 45.4 ML/MIN/1.73
EOSINOPHIL # BLD AUTO: 0.14 10*3/MM3 (ref 0–0.4)
EOSINOPHIL NFR BLD AUTO: 2.2 % (ref 0.3–6.2)
ERYTHROCYTE [DISTWIDTH] IN BLOOD BY AUTOMATED COUNT: 14.4 % (ref 12.3–15.4)
GLUCOSE BLDC GLUCOMTR-MCNC: 123 MG/DL (ref 70–130)
GLUCOSE BLDC GLUCOMTR-MCNC: 161 MG/DL (ref 70–130)
GLUCOSE BLDC GLUCOMTR-MCNC: 169 MG/DL (ref 70–130)
GLUCOSE BLDC GLUCOMTR-MCNC: 224 MG/DL (ref 70–130)
GLUCOSE SERPL-MCNC: 152 MG/DL (ref 65–99)
HBA1C MFR BLD: 6.4 % (ref 4.8–5.6)
HCT VFR BLD AUTO: 26.9 % (ref 34–46.6)
HGB BLD-MCNC: 8.4 G/DL (ref 12–15.9)
IMM GRANULOCYTES # BLD AUTO: 0.04 10*3/MM3 (ref 0–0.05)
IMM GRANULOCYTES NFR BLD AUTO: 0.6 % (ref 0–0.5)
INR PPP: 1.3 (ref 0.89–1.12)
L PNEUMO1 AG UR QL IA: NEGATIVE
LYMPHOCYTES # BLD AUTO: 0.78 10*3/MM3 (ref 0.7–3.1)
LYMPHOCYTES NFR BLD AUTO: 12.1 % (ref 19.6–45.3)
MAGNESIUM SERPL-MCNC: 1.4 MG/DL (ref 1.6–2.4)
MCH RBC QN AUTO: 24.4 PG (ref 26.6–33)
MCHC RBC AUTO-ENTMCNC: 31.2 G/DL (ref 31.5–35.7)
MCV RBC AUTO: 78.2 FL (ref 79–97)
MONOCYTES # BLD AUTO: 0.51 10*3/MM3 (ref 0.1–0.9)
MONOCYTES NFR BLD AUTO: 7.9 % (ref 5–12)
NEUTROPHILS NFR BLD AUTO: 4.98 10*3/MM3 (ref 1.7–7)
NEUTROPHILS NFR BLD AUTO: 76.9 % (ref 42.7–76)
NRBC BLD AUTO-RTO: 0 /100 WBC (ref 0–0.2)
PLATELET # BLD AUTO: 150 10*3/MM3 (ref 140–450)
PMV BLD AUTO: 12.5 FL (ref 6–12)
POTASSIUM SERPL-SCNC: 3.2 MMOL/L (ref 3.5–5.2)
PROTHROMBIN TIME: 16.4 SECONDS (ref 12.2–14.5)
QT INTERVAL: 350 MS
QTC INTERVAL: 458 MS
RBC # BLD AUTO: 3.44 10*6/MM3 (ref 3.77–5.28)
S PNEUM AG SPEC QL LA: NEGATIVE
SODIUM SERPL-SCNC: 139 MMOL/L (ref 136–145)
T4 FREE SERPL-MCNC: 1.42 NG/DL (ref 0.93–1.7)
TROPONIN T SERPL HS-MCNC: 21 NG/L
TSH SERPL DL<=0.05 MIU/L-ACNC: 4.64 UIU/ML (ref 0.27–4.2)
WBC NRBC COR # BLD: 6.47 10*3/MM3 (ref 3.4–10.8)

## 2023-08-21 PROCEDURE — 82948 REAGENT STRIP/BLOOD GLUCOSE: CPT

## 2023-08-21 PROCEDURE — 83735 ASSAY OF MAGNESIUM: CPT | Performed by: NURSE PRACTITIONER

## 2023-08-21 PROCEDURE — 87449 NOS EACH ORGANISM AG IA: CPT | Performed by: NURSE PRACTITIONER

## 2023-08-21 PROCEDURE — 85025 COMPLETE CBC W/AUTO DIFF WBC: CPT | Performed by: NURSE PRACTITIONER

## 2023-08-21 PROCEDURE — 87899 AGENT NOS ASSAY W/OPTIC: CPT | Performed by: NURSE PRACTITIONER

## 2023-08-21 PROCEDURE — 80048 BASIC METABOLIC PNL TOTAL CA: CPT | Performed by: NURSE PRACTITIONER

## 2023-08-21 PROCEDURE — 63710000001 INSULIN LISPRO (HUMAN) PER 5 UNITS: Performed by: NURSE PRACTITIONER

## 2023-08-21 PROCEDURE — 85610 PROTHROMBIN TIME: CPT | Performed by: NURSE PRACTITIONER

## 2023-08-21 PROCEDURE — 25010000002 CEFTRIAXONE PER 250 MG: Performed by: INTERNAL MEDICINE

## 2023-08-21 PROCEDURE — 99232 SBSQ HOSP IP/OBS MODERATE 35: CPT | Performed by: INTERNAL MEDICINE

## 2023-08-21 PROCEDURE — 97161 PT EVAL LOW COMPLEX 20 MIN: CPT

## 2023-08-21 PROCEDURE — 83036 HEMOGLOBIN GLYCOSYLATED A1C: CPT | Performed by: NURSE PRACTITIONER

## 2023-08-21 PROCEDURE — 84439 ASSAY OF FREE THYROXINE: CPT | Performed by: NURSE PRACTITIONER

## 2023-08-21 PROCEDURE — 25010000002 MAGNESIUM SULFATE IN D5W 1G/100ML (PREMIX) 1-5 GM/100ML-% SOLUTION: Performed by: INTERNAL MEDICINE

## 2023-08-21 PROCEDURE — 84443 ASSAY THYROID STIM HORMONE: CPT | Performed by: NURSE PRACTITIONER

## 2023-08-21 RX ORDER — METOPROLOL SUCCINATE 50 MG/1
50 TABLET, EXTENDED RELEASE ORAL 2 TIMES DAILY
Status: DISCONTINUED | OUTPATIENT
Start: 2023-08-21 | End: 2023-08-22 | Stop reason: HOSPADM

## 2023-08-21 RX ORDER — SODIUM CHLORIDE 9 MG/ML
40 INJECTION, SOLUTION INTRAVENOUS AS NEEDED
Status: DISCONTINUED | OUTPATIENT
Start: 2023-08-21 | End: 2023-08-22 | Stop reason: HOSPADM

## 2023-08-21 RX ORDER — CEFUROXIME AXETIL 250 MG/1
500 TABLET ORAL EVERY 12 HOURS SCHEDULED
Status: DISCONTINUED | OUTPATIENT
Start: 2023-08-22 | End: 2023-08-22

## 2023-08-21 RX ORDER — MEMANTINE HYDROCHLORIDE 10 MG/1
5 TABLET ORAL EVERY 12 HOURS SCHEDULED
Status: DISCONTINUED | OUTPATIENT
Start: 2023-08-21 | End: 2023-08-22 | Stop reason: HOSPADM

## 2023-08-21 RX ORDER — SODIUM CHLORIDE 0.9 % (FLUSH) 0.9 %
10 SYRINGE (ML) INJECTION EVERY 12 HOURS SCHEDULED
Status: DISCONTINUED | OUTPATIENT
Start: 2023-08-21 | End: 2023-08-22 | Stop reason: HOSPADM

## 2023-08-21 RX ORDER — LIDOCAINE 50 MG/G
1 PATCH TOPICAL
Status: DISCONTINUED | OUTPATIENT
Start: 2023-08-22 | End: 2023-08-22 | Stop reason: HOSPADM

## 2023-08-21 RX ORDER — DEXTROSE MONOHYDRATE 25 G/50ML
25 INJECTION, SOLUTION INTRAVENOUS
Status: DISCONTINUED | OUTPATIENT
Start: 2023-08-21 | End: 2023-08-22 | Stop reason: HOSPADM

## 2023-08-21 RX ORDER — LEVOTHYROXINE SODIUM 0.05 MG/1
50 TABLET ORAL
Status: DISCONTINUED | OUTPATIENT
Start: 2023-08-21 | End: 2023-08-22 | Stop reason: HOSPADM

## 2023-08-21 RX ORDER — INSULIN LISPRO 100 [IU]/ML
2-9 INJECTION, SOLUTION INTRAVENOUS; SUBCUTANEOUS
Status: DISCONTINUED | OUTPATIENT
Start: 2023-08-21 | End: 2023-08-22 | Stop reason: HOSPADM

## 2023-08-21 RX ORDER — POLYETHYLENE GLYCOL 3350 17 G/17G
17 POWDER, FOR SOLUTION ORAL DAILY PRN
Status: DISCONTINUED | OUTPATIENT
Start: 2023-08-21 | End: 2023-08-22 | Stop reason: HOSPADM

## 2023-08-21 RX ORDER — AMOXICILLIN 250 MG
2 CAPSULE ORAL 2 TIMES DAILY
Status: DISCONTINUED | OUTPATIENT
Start: 2023-08-21 | End: 2023-08-22 | Stop reason: HOSPADM

## 2023-08-21 RX ORDER — BISACODYL 10 MG
10 SUPPOSITORY, RECTAL RECTAL DAILY PRN
Status: DISCONTINUED | OUTPATIENT
Start: 2023-08-21 | End: 2023-08-22 | Stop reason: HOSPADM

## 2023-08-21 RX ORDER — PANTOPRAZOLE SODIUM 40 MG/1
40 TABLET, DELAYED RELEASE ORAL 2 TIMES DAILY
Status: DISCONTINUED | OUTPATIENT
Start: 2023-08-21 | End: 2023-08-22 | Stop reason: HOSPADM

## 2023-08-21 RX ORDER — BISACODYL 5 MG/1
5 TABLET, DELAYED RELEASE ORAL DAILY PRN
Status: DISCONTINUED | OUTPATIENT
Start: 2023-08-21 | End: 2023-08-22 | Stop reason: HOSPADM

## 2023-08-21 RX ORDER — OXYCODONE HYDROCHLORIDE 5 MG/1
5 TABLET ORAL ONCE
Status: COMPLETED | OUTPATIENT
Start: 2023-08-22 | End: 2023-08-21

## 2023-08-21 RX ORDER — FERROUS SULFATE 325(65) MG
325 TABLET ORAL
Status: DISCONTINUED | OUTPATIENT
Start: 2023-08-21 | End: 2023-08-22 | Stop reason: HOSPADM

## 2023-08-21 RX ORDER — SODIUM CHLORIDE 0.9 % (FLUSH) 0.9 %
10 SYRINGE (ML) INJECTION AS NEEDED
Status: DISCONTINUED | OUTPATIENT
Start: 2023-08-21 | End: 2023-08-22 | Stop reason: HOSPADM

## 2023-08-21 RX ORDER — PRAMIPEXOLE DIHYDROCHLORIDE 0.25 MG/1
0.5 TABLET ORAL DAILY
Status: DISCONTINUED | OUTPATIENT
Start: 2023-08-21 | End: 2023-08-22 | Stop reason: HOSPADM

## 2023-08-21 RX ORDER — MAGNESIUM SULFATE 1 G/100ML
1 INJECTION INTRAVENOUS
Status: COMPLETED | OUTPATIENT
Start: 2023-08-21 | End: 2023-08-21

## 2023-08-21 RX ORDER — POTASSIUM CHLORIDE 750 MG/1
40 CAPSULE, EXTENDED RELEASE ORAL ONCE
Status: COMPLETED | OUTPATIENT
Start: 2023-08-21 | End: 2023-08-21

## 2023-08-21 RX ORDER — GABAPENTIN 300 MG/1
300 CAPSULE ORAL 3 TIMES DAILY
Status: DISCONTINUED | OUTPATIENT
Start: 2023-08-21 | End: 2023-08-22 | Stop reason: HOSPADM

## 2023-08-21 RX ORDER — FUROSEMIDE 40 MG/1
40 TABLET ORAL DAILY
Status: DISCONTINUED | OUTPATIENT
Start: 2023-08-21 | End: 2023-08-22 | Stop reason: HOSPADM

## 2023-08-21 RX ORDER — IBUPROFEN 600 MG/1
1 TABLET ORAL
Status: DISCONTINUED | OUTPATIENT
Start: 2023-08-21 | End: 2023-08-22 | Stop reason: HOSPADM

## 2023-08-21 RX ORDER — TRAMADOL HYDROCHLORIDE 50 MG/1
25 TABLET ORAL EVERY 6 HOURS PRN
Status: DISCONTINUED | OUTPATIENT
Start: 2023-08-21 | End: 2023-08-22 | Stop reason: HOSPADM

## 2023-08-21 RX ORDER — POTASSIUM CHLORIDE 20 MEQ/1
20 TABLET, EXTENDED RELEASE ORAL 2 TIMES DAILY
Status: DISCONTINUED | OUTPATIENT
Start: 2023-08-21 | End: 2023-08-22 | Stop reason: HOSPADM

## 2023-08-21 RX ORDER — NICOTINE POLACRILEX 4 MG
15 LOZENGE BUCCAL
Status: DISCONTINUED | OUTPATIENT
Start: 2023-08-21 | End: 2023-08-22 | Stop reason: HOSPADM

## 2023-08-21 RX ORDER — HYDROCODONE BITARTRATE AND ACETAMINOPHEN 5; 325 MG/1; MG/1
1 TABLET ORAL ONCE
Status: COMPLETED | OUTPATIENT
Start: 2023-08-21 | End: 2023-08-21

## 2023-08-21 RX ORDER — DOXYCYCLINE 100 MG/1
100 CAPSULE ORAL EVERY 12 HOURS SCHEDULED
Status: DISCONTINUED | OUTPATIENT
Start: 2023-08-21 | End: 2023-08-22 | Stop reason: HOSPADM

## 2023-08-21 RX ADMIN — FUROSEMIDE 40 MG: 40 TABLET ORAL at 08:20

## 2023-08-21 RX ADMIN — INSULIN LISPRO 2 UNITS: 100 INJECTION, SOLUTION INTRAVENOUS; SUBCUTANEOUS at 08:13

## 2023-08-21 RX ADMIN — PANTOPRAZOLE SODIUM 40 MG: 40 TABLET, DELAYED RELEASE ORAL at 21:54

## 2023-08-21 RX ADMIN — SODIUM CHLORIDE 1000 MG: 900 INJECTION INTRAVENOUS at 21:51

## 2023-08-21 RX ADMIN — Medication 10 ML: at 02:03

## 2023-08-21 RX ADMIN — TRAMADOL HYDROCHLORIDE 25 MG: 50 TABLET ORAL at 22:02

## 2023-08-21 RX ADMIN — RIFAXIMIN 550 MG: 550 TABLET ORAL at 21:54

## 2023-08-21 RX ADMIN — MAGNESIUM SULFATE HEPTAHYDRATE 1 G: 1 INJECTION, SOLUTION INTRAVENOUS at 06:20

## 2023-08-21 RX ADMIN — LEVOTHYROXINE SODIUM 50 MCG: 0.05 TABLET ORAL at 06:20

## 2023-08-21 RX ADMIN — INSULIN LISPRO 2 UNITS: 100 INJECTION, SOLUTION INTRAVENOUS; SUBCUTANEOUS at 12:08

## 2023-08-21 RX ADMIN — POTASSIUM CHLORIDE 40 MEQ: 750 CAPSULE, EXTENDED RELEASE ORAL at 15:13

## 2023-08-21 RX ADMIN — Medication 10 ML: at 21:53

## 2023-08-21 RX ADMIN — FERROUS SULFATE TAB 325 MG (65 MG ELEMENTAL FE) 325 MG: 325 (65 FE) TAB at 08:18

## 2023-08-21 RX ADMIN — HYDROCODONE BITARTRATE AND ACETAMINOPHEN 1 TABLET: 5; 325 TABLET ORAL at 00:55

## 2023-08-21 RX ADMIN — LIDOCAINE 1 PATCH: 50 PATCH TOPICAL at 23:54

## 2023-08-21 RX ADMIN — METOPROLOL SUCCINATE 50 MG: 50 TABLET, EXTENDED RELEASE ORAL at 08:18

## 2023-08-21 RX ADMIN — RIVAROXABAN 15 MG: 15 TABLET, FILM COATED ORAL at 17:10

## 2023-08-21 RX ADMIN — POTASSIUM CHLORIDE 20 MEQ: 1500 TABLET, EXTENDED RELEASE ORAL at 21:54

## 2023-08-21 RX ADMIN — DOXYCYCLINE 100 MG: 100 INJECTION, POWDER, LYOPHILIZED, FOR SOLUTION INTRAVENOUS at 10:25

## 2023-08-21 RX ADMIN — MEMANTINE 5 MG: 10 TABLET ORAL at 21:54

## 2023-08-21 RX ADMIN — RIFAXIMIN 550 MG: 550 TABLET ORAL at 08:17

## 2023-08-21 RX ADMIN — METOPROLOL SUCCINATE 50 MG: 50 TABLET, EXTENDED RELEASE ORAL at 21:54

## 2023-08-21 RX ADMIN — MEMANTINE 5 MG: 10 TABLET ORAL at 08:18

## 2023-08-21 RX ADMIN — MAGNESIUM OXIDE TAB 400 MG (241.3 MG ELEMENTAL MG) 400 MG: 400 (241.3 MG) TAB at 08:17

## 2023-08-21 RX ADMIN — TRAMADOL HYDROCHLORIDE 25 MG: 50 TABLET ORAL at 13:29

## 2023-08-21 RX ADMIN — GABAPENTIN 300 MG: 300 CAPSULE ORAL at 15:13

## 2023-08-21 RX ADMIN — INSULIN LISPRO 4 UNITS: 100 INJECTION, SOLUTION INTRAVENOUS; SUBCUTANEOUS at 17:10

## 2023-08-21 RX ADMIN — Medication 10 ML: at 08:20

## 2023-08-21 RX ADMIN — DOXYCYCLINE 100 MG: 100 INJECTION, POWDER, LYOPHILIZED, FOR SOLUTION INTRAVENOUS at 00:55

## 2023-08-21 RX ADMIN — GABAPENTIN 300 MG: 300 CAPSULE ORAL at 21:54

## 2023-08-21 RX ADMIN — GABAPENTIN 300 MG: 300 CAPSULE ORAL at 08:19

## 2023-08-21 RX ADMIN — DOXYCYCLINE 100 MG: 100 CAPSULE ORAL at 21:54

## 2023-08-21 RX ADMIN — MAGNESIUM SULFATE HEPTAHYDRATE 1 G: 1 INJECTION, SOLUTION INTRAVENOUS at 08:13

## 2023-08-21 RX ADMIN — OXYCODONE HYDROCHLORIDE 5 MG: 5 TABLET ORAL at 23:54

## 2023-08-21 RX ADMIN — POTASSIUM CHLORIDE 20 MEQ: 1500 TABLET, EXTENDED RELEASE ORAL at 08:19

## 2023-08-21 RX ADMIN — PRAMIPEXOLE DIHYDROCHLORIDE 0.5 MG: 0.25 TABLET ORAL at 08:18

## 2023-08-21 RX ADMIN — PANTOPRAZOLE SODIUM 40 MG: 40 TABLET, DELAYED RELEASE ORAL at 08:20

## 2023-08-21 RX ADMIN — MAGNESIUM SULFATE HEPTAHYDRATE 1 G: 1 INJECTION, SOLUTION INTRAVENOUS at 09:06

## 2023-08-21 NOTE — PLAN OF CARE
Goal Outcome Evaluation:  Plan of Care Reviewed With: patient        Progress: no change  Outcome Evaluation: Pt came from the ED last night and is A&O with VSS, A-fib on the monitor, and on RA with spouse at the bedside. This morning her magnesium was 1.4 and I started the replacement around 0600 that will need two more doses. She complained of some pain in her right neck but after falling asleep it went away. She had one BM. There are no other complaints at this time.

## 2023-08-21 NOTE — PROGRESS NOTES
[facility] Medicine Services  PROGRESS NOTE    Patient Name: Yenny Atkins  : 1945  MRN: [id number]    Date of Admission: 2023  Primary Care Physician: Annamarie Barkley DO    Subjective   Subjective     CC: Follow-up pneumonia    HPI: No acute events overnight, patient says she is feeling okay denies SOA,    Objective   Objective     Vital Signs:   Temp:  [97.9 øF (36.6 øC)-99 øF (37.2 øC)] 99 øF (37.2 øC)  Heart Rate:  [] 91  Resp:  [18] 18  BP: (107-144)/() 116/74     Physical Exam:  Constitutional: No acute distress, awake, alert  HENT: NCAT, mucous membranes moist  Respiratory: Nonlabored respirations, no wheezes or rhonchi, on room air  Cardiovascular: Irregularly irregular, no murmurs, rubs, or gallops  Gastrointestinal: Positive bowel sounds, soft, nontender, nondistended  Musculoskeletal: No bilateral ankle edema  Psychiatric: Appropriate affect, cooperative  Neurologic: Nonfocal  Skin: No rashes      Results Reviewed:  LAB RESULTS:      Lab 23  0430 23   WBC 6.47 8.82   HEMOGLOBIN 8.4* 9.7*   HEMATOCRIT 26.9* 32.3*   PLATELETS 150 188   NEUTROS ABS 4.98 7.22*   IMMATURE GRANS (ABS) 0.04 0.03   LYMPHS ABS 0.78 0.85   MONOS ABS 0.51 0.54   EOS ABS 0.14 0.15   MCV 78.2* 80.1   CRP  --  10.98*   PROCALCITONIN  --  0.15   LACTATE  --  1.6   LDH  --  249*   PROTIME 16.4*  --          Lab 23  0431 23  0430 23   SODIUM  --  139 137   POTASSIUM  --  3.2* 2.9*   CHLORIDE  --  104 100   CO2  --  23.0 22.0   ANION GAP  --  12.0 15.0   BUN  --  30* 32*   CREATININE  --  1.21* 1.54*   EGFR  --  45.4* 34.0*   GLUCOSE  --  152* 215*   CALCIUM  --  8.6 9.2   MAGNESIUM  --  1.4* 1.6   HEMOGLOBIN A1C 6.40*  --   --    TSH  --  4.640* 2.690         Lab 23  1927   TOTAL PROTEIN 7.7   ALBUMIN 3.5   GLOBULIN 4.2   ALT (SGPT) 11   AST (SGOT) 24   BILIRUBIN 1.0   ALK PHOS 66         Lab 23  0430 23  3906  08/20/23  1927   PROBNP  --   --  4,593.0*   HSTROP T  --  21* 21*   PROTIME 16.4*  --   --    INR 1.30*  --   --                  Brief Urine Lab Results       None            Microbiology Results Abnormal       Procedure Component Value - Date/Time    COVID PRE-OP / PRE-PROCEDURE SCREENING ORDER (NO ISOLATION) - Swab, Nasopharynx [[id number]]  (Normal) Collected: 08/20/23 1945    Lab Status: Final result Specimen: Swab from Nasopharynx Updated: 08/20/23 2053    Narrative:      The following orders were created for panel order COVID PRE-OP / PRE-PROCEDURE SCREENING ORDER (NO ISOLATION) - Swab, Nasopharynx.  Procedure                               Abnormality         Status                     ---------                               -----------         ------                     Respiratory Panel PCR w/...[[id number]]  Normal              Final result                 Please view results for these tests on the individual orders.    Respiratory Panel PCR w/COVID-19(SARS-CoV-2) [facility] In-House, NP Swab in UTM/VTM, 3-4 HR TAT - Swab, Nasopharynx [[id number]]  (Normal) Collected: 08/20/23 1945    Lab Status: Final result Specimen: Swab from Nasopharynx Updated: 08/20/23 2053     ADENOVIRUS, PCR Not Detected     Coronavirus 229E Not Detected     Coronavirus HKU1 Not Detected     Coronavirus NL63 Not Detected     Coronavirus OC43 Not Detected     COVID19 Not Detected     Human Metapneumovirus Not Detected     Human Rhinovirus/Enterovirus Not Detected     Influenza A PCR Not Detected     Influenza B PCR Not Detected     Parainfluenza Virus 1 Not Detected     Parainfluenza Virus 2 Not Detected     Parainfluenza Virus 3 Not Detected     Parainfluenza Virus 4 Not Detected     RSV, PCR Not Detected     Bordetella pertussis pcr Not Detected     Bordetella parapertussis PCR Not Detected     Chlamydophila pneumoniae PCR Not Detected     Mycoplasma pneumo by PCR Not Detected    Narrative:      In the setting of a  positive respiratory panel with a viral infection PLUS a negative procalcitonin without other underlying concern for bacterial infection, consider observing off antibiotics or discontinuation of antibiotics and continue supportive care. If the respiratory panel is positive for atypical bacterial infection (Bordetella pertussis, Chlamydophila pneumoniae, or Mycoplasma pneumoniae), consider antibiotic de-escalation to target atypical bacterial infection.            XR Chest 1 View    Result Date: 8/20/2023  XR CHEST 1 VW Date of Exam: 8/20/2023 6:25 PM CDT Indication: Dysrhythmia triage protocol Comparison: 7/12/2018 Findings: Cardiomediastinal silhouette is unremarkable. There is patchy airspace disease in the right lower lung. Correlate for pneumonia. No pneumothorax nor pleural effusion. No acute osseous abnormality identified.     Impression: Impression: Right lower lobe pneumonia. Electronically Signed: Alireza Hanson MD  8/20/2023 6:40 PM CDT  Workstation ID: [id number]         Current medications:  Scheduled Meds:cefTRIAXone, 1,000 mg, Intravenous, Q24H  doxycycline, 100 mg, Intravenous, Q12H  ferrous sulfate, 325 mg, Oral, Daily With Breakfast  furosemide, 40 mg, Oral, Daily  gabapentin, 300 mg, Oral, TID  insulin lispro, 2-9 Units, Subcutaneous, 4x Daily AC & at Bedtime  levothyroxine, 50 mcg, Oral, Q AM  magnesium oxide, 400 mg, Oral, Daily  magnesium sulfate, 1 g, Intravenous, Q1H  memantine, 5 mg, Oral, Q12H  metoprolol succinate XL, 50 mg, Oral, BID  pantoprazole, 40 mg, Oral, BID  potassium chloride, 20 mEq, Oral, BID  pramipexole, 0.5 mg, Oral, Daily  riFAXIMin, 550 mg, Oral, BID  rivaroxaban, 15 mg, Oral, Daily With Dinner  senna-docusate sodium, 2 tablet, Oral, BID  sodium chloride, 10 mL, Intravenous, Q12H      Continuous Infusions:   PRN Meds:.  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        Brief Hospital Course to date:  78 y.o. female with PMH significant for A-fib on Xarelto, Crohn's disease, GERD, HTN, hypothyroid, nonalcoholic cirrhosis, RLS, DM2, CKD 3, who presents to the ED with complaint of cough and shortness of breath who was found to have concern for right lower lobe PNA failing outpatient treatment.     This patient's problems and plans were partially entered by my partner and updated as appropriate by me 08/21/23.     Right lower lobe pneumonia   -CT chest from OSH was negative for PE but did show pneumonia   -Follow-up urinary antigens, blood and sputum cultures, currently NGTD  -Continue antibiotics, currently on Rocephin and doxycycline, (can transition to oral antibiotics at discharge once pharmacy has reviewed her previous outpatient antibiotic regimen.)      A-fib with RVR  - Continue metoprolol twice daily  - Follows outpatient with cardiology [facility], has ablation scheduled for Friday  - Continue Xarelto     Hypokalemia  Hypomagnesemia  - Potassium replacement 40 mEq x 1 (secondary to renal function)  - Low-dose magnesium replacement  - BMP, magnesium in the a.m.     Well-controlled type 2 diabetes with A1c 6.4%   -FSBG's reviewed and are currently appropriate  -continue SSI diabetes mellitus 2     CASE cirrhosis  - Continue Xifaxan  - Continue Namenda     Hypertension  - Currently controlled, slightly low  - Continue metoprolol,  amlodipine     Hypothyroidism  -BaselineTSH elevated, however free T4 appropriate  - Continue levothyroxine     HFpEF  -Seems to be currently compensated  - Continue daily Lasix     RLS  - Continue Mirapex     Chronic anemia  - H&H is low but stable, continue to closely monitor      Expected Discharge Location and Transportation: rehab  Expected Discharge   Expected Discharge Date: 8/23/2023; Expected Discharge Time:      DVT prophylaxis:  Medical DVT prophylaxis orders are present.     AM-PAC 6 Clicks Score (PT): 24 (08/21/23 0133)    CODE STATUS:   Code Status and Medical Interventions:   Ordered at: 08/20/23 1557     Code Status (Patient has no pulse and is not breathing):    CPR (Attempt to Resuscitate)     Medical Interventions (Patient has pulse or is breathing):    Full Support       Zo Esteves MD  08/21/23

## 2023-08-21 NOTE — THERAPY DISCHARGE NOTE
Patient Name: Yenny Atkins  : 1945    MRN: [id number]                              Today's Date: 2023       Admit Date: 2023    Visit Dx:     ICD-10-CM ICD-9-CM   1. Community acquired pneumonia of right lower lobe of lung  J18.9 486   2. Dyspnea, unspecified type  R06.00 786.09   3. Atrial fibrillation with rapid ventricular response  I48.91 427.31   4. Productive cough  R05.8 786.2   5. Low grade fever  R50.9 780.60   6. Chronic anticoagulation  Z79.01 V58.61   7. Acute renal insufficiency  N28.9 593.9   8. Hypokalemia  E87.6 276.8   9. History of anemia  Z86.2 V12.3   10. History of hypertension  Z86.79 V12.59   11. History of diabetes mellitus  Z86.39 V12.29   12. History of Crohn's disease  Z87.19 V12.70   13. 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    Mixed anxiety depressive disorder    Neuropathy    Paroxysmal atrial fibrillation    Restless legs syndrome    Stage 3b chronic kidney disease    A-fib    Pneumonia of right lower lobe due to infectious organism     Past Medical History:   Diagnosis Date    Atrial fibrillation     Congenital hyperrotation     Crohn disease     Fatty liver     GERD (gastroesophageal reflux disease)     Hypertension     Hypothyroidism     Neuropathy     Non-alcoholic cirrhosis     Overweight (BMI 25.0-29.9)     Restless leg syndrome     Type 2 diabetes mellitus      Past Surgical  History:   Procedure Laterality Date    COLON RESE[facility]      [facility]    [facility]  [facility]/08/2019    [facility]    COLONOSCOPY N/A 11/1/2022    Procedure: COLONOSCOPY;  Surgeon: Harry Gooden MD;  Location:  [facility];  Service: Gastroenterology;  Laterality: N/A;    ENDOSCOPY N/A 11/1/2022    Procedure: ESOPHAGOGASTRODUODENOSCOPY;  Surgeon: Harry Gooden MD;  Location:  [facility];  Service: Gastroenterology;  Laterality: N/A;    KNEE SURGERY Right     TWICE    UPPER GASTROINTESTINAL ENDOSCOPY        General Information       Row Name 08/21/23 1611          Physical Therapy Time and Intention    Document Type discharge evaluation/summary  -SD     Mode of Treatment individual therapy;physical therapy  -SD       Row Name 08/21/23 1611          General Information    Patient Profile Reviewed yes  -SD     Prior Level of Function independent:;all household mobility;community mobility;gait;transfer;bed mobility;ADL's;driving;home management  pt active with her great grandchildren  -SD     Existing Precautions/Restrictions no known precautions/restrictions  -SD     Barriers to Rehab medically complex  -SD       Row Name 08/21/23 1611          Living Environment    People in Home spouse  -SD       Row Name 08/21/23 1611          Home Main Entrance    Number of Stairs, Main Entrance one  -SD       Row Name 08/21/23 1611          Stairs Within Home, Primary    Stairs, Within Home, Primary ranch-style home with basement  -SD       Row Name 08/21/23 1611          Cognition    Orientation Status (Cognition) oriented x 4  -SD       Row Name 08/21/23 1611          Safety Issues, Functional Mobility    Impairments Affecting Function (Mobility) sensation/sensory awareness  -SD     Comment, Safety Issues/Impairments (Mobility) peripheral neuropathy  -SD               User Key  (r) = Recorded By, (t) = Taken By, (c) = Cosigned By      Initials Name Provider Type     Aicha Sanches, SHEYLA Physical Therapist                   Mobility       Row Name 08/21/23 1613          Bed Mobility    Bed Mobility supine-sit  -SD     Supine-Sit [place] (Bed Mobility) modified independence  -SD     Assistive Device (Bed Mobility) head of bed elevated  -SD     Comment, (Bed Mobility) pt able to complete without difficulty  -SD       Row Name 08/21/23 1613          Transfers    Comment, (Transfers) pt able to perform without difficulty from EOB and bathroom toilet  -SD       Row Name 08/21/23 1613          Sit-Stand Transfer    Sit-Stand [place] (Transfers) independent  -SD       [facility] Name 08/21/23 1613          Gait/Stairs (Locomotion)    [place] Level (Gait) supervision  -SD     Distance in Feet (Gait) 250  -SD     Deviations/Abnormal Patterns (Gait) bilateral deviations;gait speed decreased  -SD     Bilateral Gait Deviations decreased arm swing  -SD     Comment, (Gait/Stairs) Pt amb in hallway without assistive device with no LOB, no c/o pain, dem good safety awareness.  -SD               User Key  (r) = Recorded By, (t) = Taken By, (c) = Cosigned By      Initials Name Provider Type    Aicha Sanches PT Physical Therapist                   Obj/Interventions       Row Name 08/21/23 1614          Range of Motion Comprehensive    General Range of Motion bilateral lower extremity ROM WFL  -SD       [facility] Name 08/21/23 1614          Strength Comprehensive (MMT)    General Manual Muscle Testing (MMT) Assessment no strength deficits identified  -SD     Comment, General Manual Muscle Testing (MMT) Assessment BLE's 5/5  -SD               User Key  (r) = Recorded By, (t) = Taken By, (c) = Cosigned By      Initials Name Provider Type    Aicha Sanches PT Physical Therapist                   Goals/Plan    No documentation.                  Clinical Impression       Row Name 08/21/23 1614          Pain    Pretreatment Pain Rating 0/10 - no pain  -SD      Posttreatment Pain Rating 0/10 - no pain  -SD       Row Name 08/21/23 1614          Plan of Care Review    Plan of Care Reviewed With patient;spouse  -SD     Outcome Evaluation Pt independent with bed mobility, transfers, and ambulation. No further IPPT services warranted at this time. PT rec d/c home with assist as needed.  -SD       Row Name 08/21/23 1614          Therapy Assessment/Plan (PT)    Patient/Family Therapy Goals Statement (PT) return home  -SD     Criteria for Skilled Interventions Met (PT) no problems identified which require skilled intervention  -SD     Therapy Frequency (PT) evaluation only  -SD       Row Name 08/21/23 1614          Vital Signs    Pre Systolic BP Rehab 122  -SD     Pre Treatment Diastolic BP 67  -SD     Post Systolic BP Rehab 133  -SD     Post Treatment Diastolic BP 67  -SD     Pretreatment Heart Rate (beats/min) 94  -SD     Posttreatment Heart Rate (beats/min) 110  -SD     O2 Delivery Pre Treatment room air  -SD     O2 Delivery Post Treatment room air  -SD     Pre Patient Position Supine  -SD     Post Patient Position Sitting  -SD       Row Name 08/21/23 1614          Positioning and Restraints    Pre-Treatment Position in bed  -SD     Post Treatment Position chair  -SD     In Chair notified nsg;reclined;call light within reach;encouraged to call for assist;with family/caregiver  -SD               User Key  (r) = Recorded By, (t) = Taken By, (c) = Cosigned By      Initials Name Provider Type    Aicha Sanches, PT Physical Therapist                   Outcome Measures       Row Name 08/21/23 1616          How much help from another person do you currently need...    Turning from your back to your side while in flat bed without using bedrails? 4  -SD     Moving from lying on back to sitting on the side of a flat bed without bedrails? 4  -SD     Moving to and from a bed to a chair (including a wheelchair)? 4  -SD     Standing up from a chair using your arms (e.g., wheelchair,  bedside chair)? 4  -SD     Climbing 3-5 steps with a railing? 4  -SD     To walk in hospital room? 4  -SD     AM-PAC 6 Clicks Score (PT) 24  -SD     Highest level of mobility 8 --> Walked 250 feet or more  -SD       Row Name 08/21/23 1616          Functional Assessment    Outcome Measure Options AM-PAC 6 Clicks Basic Mobility (PT)  -SD               User Key  (r) = Recorded By, (t) = Taken By, (c) = Cosigned By      Initials Name Provider Type    SD Aicha Spain, SHEYLA Physical Therapist                  Physical Therapy Education       Title: PT OT SLP Therapies (Done)       Topic: Physical Therapy (Done)       Point: Mobility training (Done)       Learning Progress Summary             Patient Acceptance, E, VU,DU by SD at 8/21/2023 1617                         Point: Body mechanics (Done)       Learning Progress Summary             Patient Acceptance, E, VU,DU by SD at 8/21/2023 1617                                         User Key       Initials Effective Dates Name Provider Type Discipline    SD 03/13/23 -  Aicha Spain PT Physical Therapist PT                  PT Recommendation and Plan     Plan of Care Reviewed With: patient, spouse  Outcome Evaluation: Pt independent with bed mobility, transfers, and ambulation. No further IPPT services warranted at this time. PT rec d/c home with assist as needed.     Time Calculation:   PT Evaluation Complexity  History, PT Evaluation Complexity: 3 or more personal factors and/or comorbidities  Examination of Body Systems (PT Eval Complexity): total of 3 or more elements  Clinical Presentation (PT Evaluation Complexity): evolving  Clinical Decision Making (PT Evaluation Complexity): low complexity  Overall Complexity (PT Evaluation Complexity): low complexity     PT Charges       Row Name 08/21/23 1618             Time Calculation    Start Time 1546  -SD      PT Non-Billable Time (min) 33 min  -SD      PT Received On 08/21/23  -SD                User Key  (r)  = Recorded By, (t) = Taken By, (c) = Cosigned By      Initials Name Provider Type    SD Aicha Spain, PT Physical Therapist                  Therapy Charges for Today       Code Description Service Date Service Provider Modifiers Qty    75171488134 HC PT EVAL LOW COMPLEXITY 3 8/21/2023 Aicha Spain, SHEYLA GP 1            PT G-Codes  Outcome Measure Options: AM-PAC 6 Clicks Basic Mobility (PT)  AM-PAC 6 Clicks Score (PT): 24    PT Discharge Summary  Anticipated Discharge Disposition (PT): home with assist    Aicha Spain, SHEYLA  8/21/2023

## 2023-08-21 NOTE — H&P
[facility] Medicine Services  HISTORY AND PHYSICAL    Patient Name: Yenny Atkins  : 1945  MRN: [id number]  Primary Care Physician: Annamarie Barkley DO  Date of admission: 2023    Subjective   Subjective     Chief Complaint:  Cough, shortness of breath     HPI:  Yenny Atkins is a 78 y.o. female with PMH significant for A-fib on Xarelto, Crohn's disease, GERD, HTN, hypothyroid, nonalcoholic cirrhosis, RLS, DM2, CKD 3, who presents to the ED with complaint of cough and shortness of breath.  She states for the past 2-3 weeks she has been having ongoing cough and shortness of breath.  She has been evaluated twice at [facility] ED with her last visit being on 2023 where she was diagnosed with pneumonia and discharged on doxycycline and Augmentin x5 days which she states she completed.  Since that time she has had increasing generalized weakness, and cough with shortness of breath.  She states that today her cough has been productive of green sputum.  She also admits to having a low-grade temp.  On 2023 she had CTA chest performed outpatient that noted no PE and right lower lobe PNA.  Of note, she is also continued to have uncontrolled atrial fibrillation.  She has required diuretics on both visits to the [facility] ED as well as rate control medication.  Upon arrival to the ED, she is noted to be in A-fib with RVR.  She has mildly elevated troponin.  She is also noted to have low potassium and low magnesium.  She has elevated CRP but negative procalcitonin and no leukocytosis.  Her anemia is stable.  CXR is concerning for RLL PNA.  She is started on doxycycline and Rocephin while in the ED.  She will be admitted to hospital medicine for further evaluation.    Review of Systems   Constitutional:  Positive for activity change, appetite change, chills, fatigue and fever. Negative for diaphoresis and unexpected weight change.   HENT: Negative.  Negative for congestion, sinus  pressure, sore throat and trouble swallowing.    Eyes: Negative.  Negative for visual disturbance.   Respiratory:  Positive for cough, choking, chest tightness and shortness of breath. Negative for wheezing.    Cardiovascular:  Positive for chest pain, palpitations and leg swelling.   Gastrointestinal:  Negative for abdominal distention, abdominal pain, constipation, diarrhea, nausea and vomiting.   Endocrine: Negative.  Negative for polydipsia and polyphagia.   Genitourinary:  Negative for difficulty urinating, dysuria, frequency and urgency.   Musculoskeletal: Negative.  Positive for arthralgias and back pain. Negative for gait problem, joint swelling, myalgias and neck pain.   Skin: Negative.  Negative for color change, pallor, rash and wound.   Allergic/Immunologic: Negative.  Negative for immunocompromised state.   Neurological:  Positive for weakness. Negative for dizziness, seizures, syncope, facial asymmetry, speech difficulty, light-headedness, numbness and headaches.   Hematological: Negative.  Does not bruise/bleed easily.   Psychiatric/Behavioral: Negative.  Negative for confusion. The patient is not nervous/anxious.           Personal History     Past Medical History:   Diagnosis Date    Atrial fibrillation     Congenital hyperrotation     Crohn disease     Fatty liver     GERD (gastroesophageal reflux disease)     Hypertension     Hypothyroidism     Neuropathy     Non-alcoholic cirrhosis     Overweight (BMI 25.0-29.9)     Restless leg syndrome     Type 2 diabetes mellitus        Past Surgical History:   Procedure Laterality Date    COLON RESE[facility]      [facility]    [facility]  08/08/2019    [facility]    COLONOSCOPY N/A 11/1/2022    Procedure: COLONOSCOPY;  Surgeon: Harry Gooden MD;  Location: [facility] ENDOSCOPY;  Service: Gastroenterology;  Laterality: N/A;    ENDOSCOPY N/A 11/1/2022    Procedure: ESOPHAGOGASTRODUODENOSCOPY;  Surgeon: Giacomo  "Harry ERIC MD;  Location: [facility] ENDOSCOPY;  Service: Gastroenterology;  Laterality: N/A;    KNEE SURGERY Right     TWICE    UPPER GASTROINTESTINAL ENDOSCOPY         Family History:  family history includes Cancer in her brother; Colon cancer in her father and paternal grandfather; Diabetes in her brother and sister; Esophageal cancer in her son; Hypertension in her mother; Stroke in her mother.     Social History:  reports that she has never smoked. She has never used smokeless tobacco. She reports that she does not drink alcohol and does not use drugs.  Social History     Social History Narrative    Not on file       Medications:  Accu-Chek Guide Me, Cyanocobalamin ER, Insulin Pen Needle, OneTouch Delica Lancets 33G, amLODIPine, dapagliflozin Propanediol, ergocalciferol, ferrous sulfate, furosemide, gabapentin, glimepiride, glucose blood, insulin aspart prot & aspart, levothyroxine, magnesium oxide, memantine, metoprolol succinate XL, pantoprazole, potassium chloride, pramipexole, riFAXIMin, rivaroxaban, and telmisartan    Allergies   Allergen Reactions    Diltiazem Shortness Of Breath    Levaquin [Levofloxacin] Anaphylaxis     Couldn't breath    Promethazine Other (See Comments)     \"feels funny\"    Promethazine Hcl Mental Status Change    Tylenol [Acetaminophen] Other (See Comments)     Feels crazy when taking tylenol       Objective   Objective     Vital Signs:   Temp:  [97.9 øF (36.6 øC)] 97.9 øF (36.6 øC)  Heart Rate:  [] 101  Resp:  [18] 18  BP: (107-144)/() 118/82    Physical Exam   Constitutional: Awake, alert, no acute distress  Eyes: PERRLA, sclerae anicteric, no conjunctival injection  HENT: NCAT, mucous membranes moist  Neck: Supple, no thyromegaly, no lymphadenopathy, trachea midline  Respiratory: Clear to auscultation left lobe, RLL rales, expiratory wheeze, nonlabored respirations   Cardiovascular: irregular tachycardic , no murmurs, rubs, or gallops, palpable pedal pulses " bilaterally  Gastrointestinal: Positive bowel sounds, soft, nontender, nondistended  Musculoskeletal: No bilateral ankle edema, no clubbing or cyanosis to extremities  Psychiatric: Appropriate affect, cooperative  Neurologic: Oriented x 3, strength symmetric in all extremities, Cranial Nerves grossly intact to confrontation, speech clear  Skin: No rashes      Result Review:  I have personally reviewed the results from the time of this admission to 8/20/2023 22:44 EDT and agree with these findings:  [x]  Laboratory list / accordion  [x]  Microbiology  [x]  Radiology  [x]  EKG/Telemetry   []  Cardiology/Vascular   []  Pathology  [x]  Old records  []  Other:  Most notable findings include:     LAB RESULTS:      Lab 08/20/23 1927 08/13/23 2322   WBC 8.82  --    HEMOGLOBIN 9.7*  --    HEMATOCRIT 32.3*  --    PLATELETS 188  --    NEUTROS ABS 7.22*  --    IMMATURE GRANS (ABS) 0.03  --    LYMPHS ABS 0.85  --    MONOS ABS 0.54  --    EOS ABS 0.15  --    MCV 80.1  --    CRP 10.98*  --    PROCALCITONIN 0.15  --    LACTATE 1.6  --    *  --    PROTIME  --  15.5*   APTT  --  39.9*         Lab 08/20/23 1927 08/13/23 2322   SODIUM 137  --    POTASSIUM 2.9*  --    CHLORIDE 100  --    CO2 22.0  --    ANION GAP 15.0  --    BUN 32*  --    CREATININE 1.54*  --    EGFR 34.0*  --    GLUCOSE 215*  --    CALCIUM 9.2  --    MAGNESIUM 1.6 1.5   TSH 2.690  --          Lab 08/20/23 1927   TOTAL PROTEIN 7.7   ALBUMIN 3.5   GLOBULIN 4.2   ALT (SGPT) 11   AST (SGOT) 24   BILIRUBIN 1.0   ALK PHOS 66         Lab 08/20/23 1927 08/13/23  2322   PROBNP 4,593.0*  --    HSTROP T 21*  --    PROTIME  --  15.5*   INR  --  1.46*                 Brief Urine Lab Results       None          Microbiology Results (last 10 days)       Procedure Component Value - Date/Time    COVID PRE-OP / PRE-PROCEDURE SCREENING ORDER (NO ISOLATION) - Swab, Nasopharynx [[id number]]  (Normal) Collected: 08/20/23 1945    Lab Status: Final result Specimen: Swab from  Nasopharynx Updated: 08/20/23 2053    Narrative:      The following orders were created for panel order COVID PRE-OP / PRE-PROCEDURE SCREENING ORDER (NO ISOLATION) - Swab, Nasopharynx.  Procedure                               Abnormality         Status                     ---------                               -----------         ------                     Respiratory Panel PCR w/...[[id number]]  Normal              Final result                 Please view results for these tests on the individual orders.    Respiratory Panel PCR w/COVID-19(SARS-CoV-2) FARA/HANNA/JACKELYN/PAD/COR/MAD/[facility]ALCOM In-House, NP Swab in UTM/VTM, 3-4 HR TAT - Swab, Nasopharynx [[id number]]  (Normal) Collected: 08/20/23 1945    Lab Status: Final result Specimen: Swab from Nasopharynx Updated: 08/20/23 2053     ADENOVIRUS, PCR Not Detected     Coronavirus 229E Not Detected     Coronavirus HKU1 Not Detected     Coronavirus NL63 Not Detected     Coronavirus OC43 Not Detected     COVID19 Not Detected     Human Metapneumovirus Not Detected     Human Rhinovirus/Enterovirus Not Detected     Influenza A PCR Not Detected     Influenza B PCR Not Detected     Parainfluenza Virus 1 Not Detected     Parainfluenza Virus 2 Not Detected     Parainfluenza Virus 3 Not Detected     Parainfluenza Virus 4 Not Detected     RSV, PCR Not Detected     Bordetella pertussis pcr Not Detected     Bordetella parapertussis PCR Not Detected     Chlamydophila pneumoniae PCR Not Detected     Mycoplasma pneumo by PCR Not Detected    Narrative:      In the setting of a positive respiratory panel with a viral infection PLUS a negative procalcitonin without other underlying concern for bacterial infection, consider observing off antibiotics or discontinuation of antibiotics and continue supportive care. If the respiratory panel is positive for atypical bacterial infection (Bordetella pertussis, Chlamydophila pneumoniae, or Mycoplasma pneumoniae), consider antibiotic de-escalation to target  atypical bacterial infection.            XR Chest 1 View    Result Date: 8/20/2023  XR CHEST 1 VW Date of Exam: 8/20/2023 6:25 PM CDT Indication: Dysrhythmia triage protocol Comparison: 7/12/2018 Findings: Cardiomediastinal silhouette is unremarkable. There is patchy airspace disease in the right lower lung. Correlate for pneumonia. No pneumothorax nor pleural effusion. No acute osseous abnormality identified.     Impression: Impression: Right lower lobe pneumonia. Electronically Signed: Alireza Hanson MD  8/20/2023 6:40 PM CDT  Workstation ID: [id number]         Assessment & Plan   Assessment & Plan       Pneumonia of right lower lobe due to infectious organism    Non-alcoholic cirrhosis    Hypertension    Type 2 diabetes mellitus with hyperglycemia    Stage 3b chronic kidney disease    A-fib    78 y.o. female with PMH significant for A-fib on Xarelto, Crohn's disease, GERD, HTN, hypothyroid, nonalcoholic cirrhosis, RLS, DM2, CKD 3, who presents to the ED with complaint of cough and shortness of breath who was found to have concern for right lower lobe PNA failing outpatient treatment.    Pneumonia right lower lobe  - BC x2 pending  - Rocephin and doxycycline  - Sputum culture  - Pneumonia urine antigens  - CTA chest from OSH on 8/17/2023 negative for PE, RLL PNA  - CBC, BMP in the a.m.    A-fib with RVR  - Continue metoprolol twice daily  - Follows outpatient with cardiology [facility], has ablation scheduled for Friday  - Continue Xarelto    Hypokalemia  Hypomagnesemia  - Potassium replacement 40 mEq x 1 (secondary to renal function)  - Low-dose magnesium replacement  - BMP, magnesium in the a.m.    Diabetes mellitus 2  - FSBG ACHS  - SS insulin  - Hemoglobin A1c in the a.m.    CASE cirrhosis  - Continue Xifaxan  - Continue Namenda    Hypertension  - Currently controlled  - Continue metoprolol, amlodipine    Hypothyroid  - Continue levothyroxine    Diastolic CHF  - Continue daily Lasix    RLS  - Continue  Mirapex    Chronic anemia  - Stable  - CBC in the a.m.      DVT prophylaxis:  On Xarelto     CODE STATUS:    Code Status (Patient has no pulse and is not breathing): CPR (Attempt to Resuscitate)  Medical Interventions (Patient has pulse or is breathing): Full Support      Expected Discharge  Expected Discharge Date: 8/23/2023; Expected Discharge Time:       This note has been completed as part of a split-shared workflow.     Signature: Electronically signed by EARNEST Enriquez, 08/20/23, 10:44 PM EDT.  Total APC time spent 60 minutes        Attending   Admission Attestation       I have performed an independent face-to-face diagnostic evaluation including performing an independent physical examination.  The documented plan of care above was reviewed and developed with Ms. Martinez.  I have updated the HPI as appropriate.    Brief HPI    Ms. Atkins is an 78 WF with PMH significant for A-fib on Xarelto, Crohn's disease, GERD, HTN, hypothyroid, nonalcoholic cirrhosis, RLS, DM2, CKD 3 who presented with several days of cough, malaise, and low grade fever. Pt states that she was seen outpatient and has been on ABX for four days without any improvement in symptoms. Her cough is now intermittently productive of green sputum. In addition, she continues to have low grade fever of 100.2F at maximum.  Pt also reports that she has been in and out of Afib for over three weeks and has been seen at [facility] multiple times for this in the recent weeks. She is supposed to have an ablation with her cardiologist on Friday, 8/25.     Attending Physical Exam:  Temp:  [97.9 øF (36.6 øC)] 97.9 øF (36.6 øC)  Heart Rate:  [] 101  Resp:  [18] 18  BP: (107-144)/() 118/82    Constitutional: Awake, alert  Eyes: PERRLA, sclerae anicteric, no conjunctival injection  HENT: NCAT, mucous membranes moist  Neck: Supple, no thyromegaly, no lymphadenopathy, trachea midline  Respiratory: Poor air movement bilaterally,  nonlabored  respirations   Cardiovascular: irregularly irregular with rates in the low 100s,  no murmurs, rubs, or gallops, palpable pedal pulses bilaterally  Gastrointestinal: Positive bowel sounds, soft, nontender, nondistended  Musculoskeletal: No bilateral ankle edema, no clubbing or cyanosis to extremities  Psychiatric: Appropriate affect, cooperative  Neurologic: Oriented x 3, strength symmetric in all extremities, Cranial Nerves grossly intact to confrontation, speech clear  Skin: No rashes      Assessment and Plan:    See assessment and plan documented by APC above and updated/edited by me as appropriate.    Leann Carvajal MD  08/20/23

## 2023-08-21 NOTE — PLAN OF CARE
Goal Outcome Evaluation:  Plan of Care Reviewed With: patient, spouse           Outcome Evaluation: Pt independent with bed mobility, transfers, and ambulation. No further IPPT services warranted at this time. PT rec d/c home with assist as needed.      Anticipated Discharge Disposition (PT): home with assist

## 2023-08-21 NOTE — PROGRESS NOTES
"                  Clinical Nutrition   Nutrition Assessment  Reason for Visit: MST score 2+, Unintentional weight loss, Reduced oral intake    Patient Name: Yenny Atkins  YOB: 1945  MRN: [id number]  Date of Encounter: 08/21/23 14:48 EDT  Admission date: 8/20/2023        Nutrition Assessment   Admission Diagnosis:  A-fib [I48.91]    Problem List:    Pneumonia of right lower lobe due to infectious organism    Non-alcoholic cirrhosis    Hypertension    Type 2 diabetes mellitus with hyperglycemia    Stage 3b chronic kidney disease    A-fib      PMH:   She  has a past medical history of Atrial fibrillation, Congenital hyperrotation, Crohn disease, Fatty liver, GERD (gastroesophageal reflux disease), Hypertension, Hypothyroidism, Neuropathy, Non-alcoholic cirrhosis, Overweight (BMI 25.0-29.9), Restless leg syndrome, and Type 2 diabetes mellitus.    PSH:  She  has a past surgical history that includes Knee surgery (Right); Colectomy; Upper gastrointestinal endoscopy; Colonoscopy (2017); Colonoscopy (08/08/2019); Colonoscopy (N/A, 11/1/2022); and Esophagogastroduodenoscopy (N/A, 11/1/2022).    Applicable Nutrition Concerns:   Skin:  Oral:  GI:    Applicable Interval History:       Reported/Observed/Food/Nutrition Related History:     Pt was sitting up in bed in NAD. Denies  changes to weight or appetite/intakes. Denies any nutritional concerns. Denies further dietary needs/preferences, NKFA.     Anthropometrics     Height: Height: 160 cm (63\")  Last Filed Weight: Weight: 69.7 kg (153 lb 9.6 oz) (08/21/23 0120)  Method: Weight Method: Standing scale  BMI: BMI (Calculated): 27.2  BMI classification: Overweight: 25.0-29.9kg/m2   IBW:   115 lb    UBW:     Weight      Weight (kg) Weight (lbs) Weight Method   2/23/2022 73.936 kg  163 lb     3/18/2022 74.299 kg  163 lb 12.8 oz     9/19/2022 70.761 kg  156 lb     11/1/2022 73.483 kg  162 lb  Stated    4/5/2023 73.211 kg  161 lb 6.4 oz     7/5/2023 72.213 kg  159 " lb 3.2 oz     8/20/2023 69.4 kg  153 lb  Stated    8/21/2023 69.673 kg  153 lb 9.6 oz  Standing scale      Weight change: weight loss of 6 lbs (3.7%) over the past 2 month(s)    Intentional?  No (not significant for time frame / body habitus)    Nutrition Focused Physical Exam     Date:  8/21       Pt does not meet criteria for malnutrition diagnosis, at this time.      Current Nutrition Prescription   PO: Diet: Cardiac Diets, Diabetic Diets; Healthy Heart (2-3 Na+); Consistent Carbohydrate; Texture: Regular Texture (IDDSI 7); Fluid Consistency: Thin (IDDSI 0)  Oral Nutrition Supplement:   Intake: Insufficient data    Nutrition Diagnosis   Date:  8/21            Updated:    Problem No nutrition diagnosis at this time    Etiology    Signs/Symptoms    Status: New    Goal:   General: Nutrition to support treatment  PO: Increase intake  EN/PN: N/A    Nutrition Intervention      Follow treatment progress, Care plan reviewed, Advise alternate selection, Advised available snacks, Interview for preferences, Menu provided, Encourage intake, Supplement offered/refused    No acute nutritional needs    Monitoring/Evaluation:   Per protocol, I&O, PO intake, Supplement intake, Pertinent labs, Weight, GI status, Symptoms, POC/GOC      Danii Man RD, CNSC  Time Spent: 30m

## 2023-08-21 NOTE — CASE MANAGEMENT/SOCIAL WORK
Discharge Planning Assessment  [facility]     Patient Name: Yenny Atkins  MRN: [id number]  Today's Date: 8/21/2023    Admit Date: 8/20/2023    Plan: Home with Family   Discharge Needs Assessment       Row Name 08/21/23 1520       Living Environment    People in Home spouse    Name(s) of People in Home Paul Atkins-     Current Living Arrangements home    Potentially Unsafe Housing Conditions none    Primary Care Provided by self    Provides Primary Care For no one    Family Caregiver if Needed child(héctor), adult    Family Caregiver Names Abdirizak Atkins- son    Quality of Family Relationships unable to assess    Able to Return to Prior Arrangements yes       Transition Planning    Patient/Family Anticipates Transition to home with family    Transportation Anticipated family or friend will provide       Discharge Needs Assessment    Readmission Within the Last 30 Days no previous admission in last 30 days    Equipment Currently Used at Home glucometer    Concerns to be Addressed discharge planning    Current Discharge Risk chronically ill                   Discharge Plan       Row Name 08/21/23 1522       Plan    Plan Home with Family    Patient/Family in Agreement with Plan yes    Plan Comments I have met with Mrs. Atkins at the bedside today to initiate a discharge plan.  She states that she lives with her  in a home they share in [facility].  She reports that she is independent with activities of daily living and mobility.   She denies use of DME and current receipt of home health/outpatient services.  She denies difficulty affording/obtaining prescription medications.  PT consult is pending.  She anticipates no discharge needs and states that she will be returning home when ready for discharge.  CM will cont to follow the plan of care and assist with discharge planning as recommendations become available.    Final Discharge Disposition Code 01 - home or self-care                  Continued  Care and Services - Admitted Since 8/20/2023    Coordination has not been started for this encounter.       Expected Discharge Date and Time       Expected Discharge Date Expected Discharge Time    Aug 22, 2023            Demographic Summary       Row Name 08/21/23 1518       General Information    Admission Type inpatient    Arrived From home    Referral Source admission list    Reason for Consult discharge planning    General Information Comments I have confirmed with Mrs. Atkins that her PCP is DO shar Almanza her insurance is [employer].       Contact Information    Permission Granted to Share Info With                    Functional Status       Row Name 08/21/23 1519       Functional Status, IADL    Medications independent    Meal Preparation independent    Housekeeping independent    Laundry independent    Shopping independent       Employment/    Employment Status retired                   Psychosocial    No documentation.                  Abuse/Neglect    No documentation.                  Legal    No documentation.                  Substance Abuse    No documentation.                  Patient Forms    No documentation.                     Zakiya Rodas RN

## 2023-08-22 ENCOUNTER — READMISSION MANAGEMENT (OUTPATIENT)
Dept: CALL CENTER | Facility: HOSPITAL | Age: 78
End: 2023-08-22
Payer: MEDICARE

## 2023-08-22 VITALS
RESPIRATION RATE: 17 BRPM | SYSTOLIC BLOOD PRESSURE: 103 MMHG | DIASTOLIC BLOOD PRESSURE: 68 MMHG | WEIGHT: 162.8 LBS | HEIGHT: 63 IN | OXYGEN SATURATION: 90 % | TEMPERATURE: 98 F | HEART RATE: 100 BPM | BODY MASS INDEX: 28.84 KG/M2

## 2023-08-22 LAB
ANION GAP SERPL CALCULATED.3IONS-SCNC: 11 MMOL/L (ref 5–15)
BUN SERPL-MCNC: 21 MG/DL (ref 8–23)
BUN/CREAT SERPL: 19.8 (ref 7–25)
CALCIUM SPEC-SCNC: 8.7 MG/DL (ref 8.6–10.5)
CHLORIDE SERPL-SCNC: 104 MMOL/L (ref 98–107)
CO2 SERPL-SCNC: 22 MMOL/L (ref 22–29)
CREAT SERPL-MCNC: 1.06 MG/DL (ref 0.57–1)
EGFRCR SERPLBLD CKD-EPI 2021: 53.2 ML/MIN/1.73
GLUCOSE BLDC GLUCOMTR-MCNC: 143 MG/DL (ref 70–130)
GLUCOSE BLDC GLUCOMTR-MCNC: 163 MG/DL (ref 70–130)
GLUCOSE SERPL-MCNC: 134 MG/DL (ref 65–99)
MAGNESIUM SERPL-MCNC: 1.8 MG/DL (ref 1.6–2.4)
POTASSIUM SERPL-SCNC: 4 MMOL/L (ref 3.5–5.2)
SODIUM SERPL-SCNC: 137 MMOL/L (ref 136–145)

## 2023-08-22 PROCEDURE — 63710000001 INSULIN LISPRO (HUMAN) PER 5 UNITS: Performed by: NURSE PRACTITIONER

## 2023-08-22 PROCEDURE — 80048 BASIC METABOLIC PNL TOTAL CA: CPT | Performed by: INTERNAL MEDICINE

## 2023-08-22 PROCEDURE — 83735 ASSAY OF MAGNESIUM: CPT | Performed by: INTERNAL MEDICINE

## 2023-08-22 PROCEDURE — 82948 REAGENT STRIP/BLOOD GLUCOSE: CPT

## 2023-08-22 RX ORDER — CEFUROXIME AXETIL 500 MG/1
500 TABLET ORAL EVERY 12 HOURS SCHEDULED
Qty: 9 TABLET | Refills: 0 | Status: SHIPPED | OUTPATIENT
Start: 2023-08-22 | End: 2023-08-27

## 2023-08-22 RX ORDER — GUAIFENESIN 600 MG/1
600 TABLET, EXTENDED RELEASE ORAL EVERY 12 HOURS SCHEDULED
Qty: 20 TABLET | Refills: 0 | Status: SHIPPED | OUTPATIENT
Start: 2023-08-22

## 2023-08-22 RX ORDER — CEFUROXIME AXETIL 250 MG/1
500 TABLET ORAL EVERY 12 HOURS SCHEDULED
Status: DISCONTINUED | OUTPATIENT
Start: 2023-08-22 | End: 2023-08-22 | Stop reason: HOSPADM

## 2023-08-22 RX ORDER — DOXYCYCLINE 100 MG/1
100 CAPSULE ORAL EVERY 12 HOURS SCHEDULED
Qty: 10 CAPSULE | Refills: 0 | Status: SHIPPED | OUTPATIENT
Start: 2023-08-22 | End: 2023-08-27

## 2023-08-22 RX ORDER — TRAMADOL HYDROCHLORIDE 50 MG/1
25 TABLET ORAL EVERY 6 HOURS PRN
Qty: 6 TABLET | Refills: 0 | Status: SHIPPED | OUTPATIENT
Start: 2023-08-22 | End: 2023-08-25

## 2023-08-22 RX ORDER — GUAIFENESIN 600 MG/1
600 TABLET, EXTENDED RELEASE ORAL EVERY 12 HOURS SCHEDULED
Status: DISCONTINUED | OUTPATIENT
Start: 2023-08-22 | End: 2023-08-22 | Stop reason: HOSPADM

## 2023-08-22 RX ORDER — FERROUS SULFATE 325(65) MG
325 TABLET ORAL
Qty: 30 TABLET | Refills: 0 | Status: SHIPPED | OUTPATIENT
Start: 2023-08-23

## 2023-08-22 RX ORDER — POTASSIUM CHLORIDE 20 MEQ/1
20 TABLET, EXTENDED RELEASE ORAL DAILY
Qty: 30 TABLET | Refills: 0 | Status: SHIPPED | OUTPATIENT
Start: 2023-08-22

## 2023-08-22 RX ORDER — METOPROLOL SUCCINATE 50 MG/1
50 TABLET, EXTENDED RELEASE ORAL 2 TIMES DAILY
Qty: 60 TABLET | Refills: 0 | Status: SHIPPED | OUTPATIENT
Start: 2023-08-22

## 2023-08-22 RX ADMIN — CEFUROXIME AXETIL 500 MG: 250 TABLET, FILM COATED ORAL at 09:43

## 2023-08-22 RX ADMIN — LEVOTHYROXINE SODIUM 50 MCG: 0.05 TABLET ORAL at 05:33

## 2023-08-22 RX ADMIN — METOPROLOL SUCCINATE 50 MG: 50 TABLET, EXTENDED RELEASE ORAL at 08:32

## 2023-08-22 RX ADMIN — GABAPENTIN 300 MG: 300 CAPSULE ORAL at 08:33

## 2023-08-22 RX ADMIN — Medication 10 ML: at 08:33

## 2023-08-22 RX ADMIN — RIFAXIMIN 550 MG: 550 TABLET ORAL at 08:32

## 2023-08-22 RX ADMIN — PANTOPRAZOLE SODIUM 40 MG: 40 TABLET, DELAYED RELEASE ORAL at 08:32

## 2023-08-22 RX ADMIN — FUROSEMIDE 40 MG: 40 TABLET ORAL at 08:31

## 2023-08-22 RX ADMIN — POTASSIUM CHLORIDE 20 MEQ: 1500 TABLET, EXTENDED RELEASE ORAL at 08:45

## 2023-08-22 RX ADMIN — MEMANTINE 5 MG: 10 TABLET ORAL at 08:32

## 2023-08-22 RX ADMIN — DOXYCYCLINE 100 MG: 100 CAPSULE ORAL at 08:39

## 2023-08-22 RX ADMIN — PRAMIPEXOLE DIHYDROCHLORIDE 0.5 MG: 0.25 TABLET ORAL at 08:31

## 2023-08-22 RX ADMIN — FERROUS SULFATE TAB 325 MG (65 MG ELEMENTAL FE) 325 MG: 325 (65 FE) TAB at 08:32

## 2023-08-22 RX ADMIN — MAGNESIUM OXIDE TAB 400 MG (241.3 MG ELEMENTAL MG) 400 MG: 400 (241.3 MG) TAB at 08:32

## 2023-08-22 RX ADMIN — INSULIN LISPRO 2 UNITS: 100 INJECTION, SOLUTION INTRAVENOUS; SUBCUTANEOUS at 11:20

## 2023-08-22 RX ADMIN — GUAIFENESIN 600 MG: 600 TABLET, EXTENDED RELEASE ORAL at 10:25

## 2023-08-22 NOTE — CASE MANAGEMENT/SOCIAL WORK
Case Management Discharge Note      Final Note: Pt discharged home with spouse. No discharge needs identified.         Selected Continued Care - Discharged on 8/22/2023 Admission date: 8/20/2023 - Discharge disposition: Home or Self Care      Destination    No services have been selected for the patient.                Durable Medical Equipment    No services have been selected for the patient.                Dialysis/Infusion    No services have been selected for the patient.                Home Medical Care    No services have been selected for the patient.                Therapy    No services have been selected for the patient.                Community Resources    No services have been selected for the patient.                Community & DME    No services have been selected for the patient.                         Final Discharge Disposition Code: 01 - home or self-care

## 2023-08-22 NOTE — DISCHARGE SUMMARY
[facility] Medicine Services  DISCHARGE SUMMARY    Patient Name: Yenny Atkins  : 1945  MRN: [id number]    Date of Admission: 2023  7:04 PM  Date of Discharge:  2023  Primary Care Physician: Annamarie Barkley DO    Consults       No orders found from 2023 to 2023.            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          Hospital Course:  Yenny Atkins is a 78 y.o. female  with PMH significant for A-fib on Xarelto, Crohn's disease, GERD, HTN, hypothyroid, nonalcoholic cirrhosis, RLS, DM2, CKD 3, who presents to the ED with complaint of cough and shortness of breath who was found to have concern for right lower lobe PNA failing outpatient treatment.       Right lower lobe pneumonia   -CT chest from OSH was negative for PE but did show pneumonia   -Follow-up urinary antigens, blood and sputum cultures, currently NGTD  -Continue antibiotics, switched IVRocephin and doxycycline, to oral. Add mucinex for cough  -short course tramadol for muscle strain with cough    A-fib with RVR  - Continue metoprolol twice daily  - Follows outpatient with cardiology [facility], has ablation scheduled for Friday  - Continue Xarelto     Hypokalemia  Hypomagnesemia  - Potassium replacement   - Low-dose magnesium replacement  - BMP, magnesium in the a.m.     Well-controlled type 2 diabetes with A1c 6.4%   -FSBG's reviewed and are currently appropriate  -continue home regimen     CASE cirrhosis  - Continue Xifaxan  - Continue Namenda     Hypertension  - Currently controlled, slightly low  -  Continue metoprolol- changed to bid, losartan on hold     Hypothyroidism  -BaselineTSH elevated, however free T4 appropriate  - Continue levothyroxine     HFpEF  -Seems to be currently compensated  - Continue daily Lasix     RLS  - Continue Mirapex     Chronic anemia  - H&H is low but stable, continue  iron replacement      Discharge Follow Up Recommendations for outpatient labs/diagnostics:   PCP follow up 1 week  Follow up 8/25 as scheduled for ablation at [facility]    Day of Discharge     HPI:   On room air and feeling better. Still with cough and some neck pain. Feels like she is at baseline mobility    Review of Systems  Gen- No fevers, chills  CV- No chest pain, palpitations  Resp- No dyspnea  GI- No N/V/D, abd pain      Vital Signs:   Temp:  [97.7 øF (36.5 øC)-98.3 øF (36.8 øC)] 97.9 øF (36.6 øC)  Heart Rate:  [] 134  Resp:  [18-19] 18  BP: (106-129)/(63-77) 117/63      Physical Exam:  Constitutional: No acute distress, awake, alert  HENT: NCAT, mucous membranes moist  Respiratory: Clear to auscultation bilaterally, respiratory effort normal   Cardiovascular: irreg- rate , no murmurs, rubs, or gallops  Gastrointestinal: Positive bowel sounds, soft, nontender, nondistended  Musculoskeletal: No bilateral ankle edema  Psychiatric: Appropriate affect, cooperative  Neurologic: Oriented x 3, strength symmetric in all extremities, Cranial Nerves grossly intact to confrontation, speech clear  Skin: No rashes      Pertinent  and/or Most Recent Results     LAB RESULTS:      Lab 08/21/23  0430 08/20/23  1927   WBC 6.47 8.82   HEMOGLOBIN 8.4* 9.7*   HEMATOCRIT 26.9* 32.3*   PLATELETS 150 188   NEUTROS ABS 4.98 7.22*   IMMATURE GRANS (ABS) 0.04 0.03   LYMPHS ABS 0.78 0.85   MONOS ABS 0.51 0.54   EOS ABS 0.14 0.15   MCV 78.2* 80.1   CRP  --  10.98*   PROCALCITONIN  --  0.15   LACTATE  --  1.6   LDH  --  249*   PROTIME 16.4*  --          Lab 08/22/23  0451 08/22/23  0013 08/21/23  0431 08/21/23  0436  08/20/23 1927   SODIUM 137  --   --  139 137   POTASSIUM 4.0  --   --  3.2* 2.9*   CHLORIDE 104  --   --  104 100   CO2 22.0  --   --  23.0 22.0   ANION GAP 11.0  --   --  12.0 15.0   BUN 21  --   --  30* 32*   CREATININE 1.06*  --   --  1.21* 1.54*   EGFR 53.2*  --   --  45.4* 34.0*   GLUCOSE 134*  --   --  152* 215*   CALCIUM 8.7  --   --  8.6 9.2   MAGNESIUM  --  1.8  --  1.4* 1.6   HEMOGLOBIN A1C  --   --  6.40*  --   --    TSH  --   --   --  4.640* 2.690         Lab 08/20/23 1927   TOTAL PROTEIN 7.7   ALBUMIN 3.5   GLOBULIN 4.2   ALT (SGPT) 11   AST (SGOT) 24   BILIRUBIN 1.0   ALK PHOS 66         Lab 08/21/23 0430 08/20/23 2209 08/20/23 1927   PROBNP  --   --  4,593.0*   HSTROP T  --  21* 21*   PROTIME 16.4*  --   --    INR 1.30*  --   --                  Brief Urine Lab Results       None          Microbiology Results (last 10 days)       Procedure Component Value - Date/Time    Legionella Antigen, Urine - Urine, Urine, Clean Catch [[id number]]  (Normal) Collected: 08/21/23 0220    Lab Status: Final result Specimen: Urine, Clean Catch Updated: 08/21/23 0941     LEGIONELLA ANTIGEN, URINE Negative    S. Pneumo Ag Urine or CSF - Urine, Urine, Clean Catch [[id number]]  (Normal) Collected: 08/21/23 0220    Lab Status: Final result Specimen: Urine, Clean Catch Updated: 08/21/23 0941     Strep Pneumo Ag Negative    Blood Culture - Blood, Hand, Right [[id number]]  (Normal) Collected: 08/20/23 2209    Lab Status: Preliminary result Specimen: Blood from Hand, Right Updated: 08/21/23 2230     Blood Culture No growth at 24 hours    Blood Culture - Blood, Arm, Left [[id number]]  (Normal) Collected: 08/20/23 2155    Lab Status: Preliminary result Specimen: Blood from Arm, Left Updated: 08/21/23 2230     Blood Culture No growth at 24 hours    COVID PRE-OP / PRE-PROCEDURE SCREENING ORDER (NO ISOLATION) - Swab, Nasopharynx [[id number]]  (Normal) Collected: 08/20/23 1945    Lab Status: Final result Specimen: Swab from  Nasopharynx Updated: 08/20/23 2053    Narrative:      The following orders were created for panel order COVID PRE-OP / PRE-PROCEDURE SCREENING ORDER (NO ISOLATION) - Swab, Nasopharynx.  Procedure                               Abnormality         Status                     ---------                               -----------         ------                     Respiratory Panel PCR w/...[[id number]]  Normal              Final result                 Please view results for these tests on the individual orders.    Respiratory Panel PCR w/COVID-19(SARS-CoV-2) [facility]N/[facility] In-House, NP Swab in UTM/VTM, 3-4 HR TAT - Swab, Nasopharynx [[id number]]  (Normal) Collected: 08/20/23 1945    Lab Status: Final result Specimen: Swab from Nasopharynx Updated: 08/20/23 2053     ADENOVIRUS, PCR Not Detected     Coronavirus 229E Not Detected     Coronavirus HKU1 Not Detected     Coronavirus NL63 Not Detected     Coronavirus OC43 Not Detected     COVID19 Not Detected     Human Metapneumovirus Not Detected     Human Rhinovirus/Enterovirus Not Detected     Influenza A PCR Not Detected     Influenza B PCR Not Detected     Parainfluenza Virus 1 Not Detected     Parainfluenza Virus 2 Not Detected     Parainfluenza Virus 3 Not Detected     Parainfluenza Virus 4 Not Detected     RSV, PCR Not Detected     Bordetella pertussis pcr Not Detected     Bordetella parapertussis PCR Not Detected     Chlamydophila pneumoniae PCR Not Detected     Mycoplasma pneumo by PCR Not Detected    Narrative:      In the setting of a positive respiratory panel with a viral infection PLUS a negative procalcitonin without other underlying concern for bacterial infection, consider observing off antibiotics or discontinuation of antibiotics and continue supportive care. If the respiratory panel is positive for atypical bacterial infection (Bordetella pertussis, Chlamydophila pneumoniae, or Mycoplasma pneumoniae), consider antibiotic de-escalation to target  atypical bacterial infection.            XR Chest 1 View    Result Date: 8/20/2023  XR CHEST 1 VW Date of Exam: 8/20/2023 6:25 PM CDT Indication: Dysrhythmia triage protocol Comparison: 7/12/2018 Findings: Cardiomediastinal silhouette is unremarkable. There is patchy airspace disease in the right lower lung. Correlate for pneumonia. No pneumothorax nor pleural effusion. No acute osseous abnormality identified.     Impression: Right lower lobe pneumonia. Electronically Signed: Alireza Hanson MD  8/20/2023 6:40 PM CDT  Workstation ID: [id number]    XR Chest 1 View    Result Date: 8/14/2023  PORTABLE CHEST HISTORY: Palpitations, hypertension, A. fib. COMPARISON: August 11, 2023. FINDINGS:  A portable view of the chest was obtained. The cardiac silhouette is stable in size. There is been no significant change in the increased interstitial markings. There has been slight worsening of the right basilar lung opacity. The left basilar lung opacity is stable. There are bilateral pleural effusions noted. There is no pneumothorax.    Interval worsening of right basilar lung opacity. Bilateral pleural effusions. Images reviewed, interpreted, and dictated by Dr. Mulu Peña. Transcribed by Charlette Moura PA-C.    XR Chest 1 View    Result Date: 8/11/2023  EXAMINATION TECHNIQUE: XR CHEST 1 VIEW PORTABLE / BEDSIDE CLINICAL HISTORY: Chest Pain COMPARISON: 7/23/2023 FINDINGS: Pulmonary venous congestion with diffuse bilateral pulmonary interstitial opacities. Probable small pleural effusions with basal atelectasis. No pneumothorax. Cardiomegaly.    Lung findings are most compatible with cardiogenic pulmonary edema/CHF. Images personally reviewed, interpreted and dictated by OPAL Sanchez M.D.                 Plan for Follow-up of Pending Labs/Results:   Pending Labs       Order Current Status    Blood Culture - Blood, Arm, Left Preliminary result    Blood Culture - Blood, Hand, Right Preliminary result          Discharge Details         Discharge Medications        New Medications        Instructions Start Date   cefuroxime 500 MG tablet  Commonly known as: CEFTIN   500 mg, Oral, Every 12 Hours Scheduled      doxycycline 100 MG capsule  Commonly known as: MONODOX   100 mg, Oral, Every 12 Hours Scheduled      guaiFENesin 600 MG 12 hr tablet  Commonly known as: MUCINEX   600 mg, Oral, Every 12 Hours Scheduled      traMADol 50 MG tablet  Commonly known as: ULTRAM   25 mg, Oral, Every 6 Hours PRN             Changes to Medications        Instructions Start Date   ferrous sulfate 325 (65 FE) MG tablet  What changed: when to take this   325 mg, Oral, Daily With Breakfast   Start Date: August 23, 2023     metoprolol succinate XL 50 MG 24 hr tablet  Commonly known as: TOPROL-XL  What changed:   medication strength  how much to take  how to take this  when to take this   50 mg, Oral, 2 Times Daily      potassium chloride 20 MEQ CR tablet  Commonly known as: K-DUR,KLOR-CON  What changed: when to take this   20 mEq, Oral, Daily             Continue These Medications        Instructions Start Date   Accu-Chek Guide Me w/Device kit   1 each, Other, 3 Times Daily, use to test blood sugar 3 times daily      BD Pen Needle Armida 2nd Gen 32G X 4 MM misc  Generic drug: Insulin Pen Needle   USE AS DIRECTED TWICE A DAY WITH INJECTIONS      Cyanocobalamin ER 1000 MCG tablet controlled-release   1,000 mcg, Oral, Every 30 Days      ergocalciferol 1.25 MG (36005 UT) capsule  Commonly known as: ERGOCALCIFEROL   ergocalciferol (vitamin D2) 1,250 mcg (50,000 unit) capsule   TAKE 1 CAPSULE BY MOUTH ONE TIME PER WEEK      furosemide 40 MG tablet  Commonly known as: LASIX   40 mg, Oral, Every Morning      gabapentin 300 MG capsule  Commonly known as: NEURONTIN   300 mg, Oral, 3 Times Daily      levothyroxine 50 MCG tablet  Commonly known as: SYNTHROID, LEVOTHROID   levothyroxine 50 mcg tablet   TAKE 1 TABLET BY MOUTH EVERY DAY      magnesium oxide 400 MG tablet  Commonly known  "as: MAG-OX   Daily      memantine 10 MG tablet  Commonly known as: NAMENDA   10 mg, Oral, 2 Times Daily      NovoLOG Mix 70/30 FlexPen (70-30) 100 UNIT/ML suspension pen-injector injection  Generic drug: insulin aspart prot & aspart   Novolog Mix 70-30 FlexPen U-100 Insulin 100 unit/mL subcutaneous pen   Administer 20 units before breakfast and 20 units before dinner      OneTouch Delica Lancets 33G misc   testing 1x per day; E11.65      OneTouch Verio test strip  Generic drug: glucose blood   qd Dx code: e11.65      pantoprazole 40 MG EC tablet  Commonly known as: PROTONIX   40 mg, Oral, 2 Times Daily      pramipexole 0.5 MG tablet  Commonly known as: MIRAPEX   0.5 mg, Oral, Daily      riFAXIMin 550 MG tablet  Commonly known as: XIFAXAN   550 mg, Oral, 2 Times Daily      Xarelto 15 MG tablet  Generic drug: rivaroxaban   1 tablet, Oral, Daily             Stop These Medications      telmisartan 20 MG tablet  Commonly known as: MICARDIS              Allergies   Allergen Reactions    Diltiazem Shortness Of Breath    Levaquin [Levofloxacin] Anaphylaxis     Couldn't breath    Promethazine Other (See Comments)     \"feels funny\"    Promethazine Hcl Mental Status Change    Tylenol [Acetaminophen] Other (See Comments)     Feels crazy when taking tylenol         Discharge Disposition:  Home or Self Care    Diet:  Hospital:  Diet Order   Procedures    Diet: Cardiac Diets, Diabetic Diets; Healthy Heart (2-3 Na+); Consistent Carbohydrate; Texture: Regular Texture (IDDSI 7); Fluid Consistency: Thin (IDDSI 0)       Activity:  Activity Instructions       Activity as Tolerated              Restrictions or Other Recommendations:         CODE STATUS:    Code Status and Medical Interventions:   Ordered at: 08/20/23 4482     Code Status (Patient has no pulse and is not breathing):    CPR (Attempt to Resuscitate)     Medical Interventions (Patient has pulse or is breathing):    Full Support       Future Appointments   Date Time Provider " Department Center   10/5/2023 10:00 AM Aleyda Stearns APRN [facility]       Additional Instructions for the Follow-ups that You Need to Schedule       Discharge Follow-up with PCP   As directed       Currently Documented PCP:    Annamarie Barkley DO    PCP Phone Number:    [phone]     Follow Up Details: 1 week        Discharge Follow-up with Specified Provider: cardiology as scheduled Friday   As directed      To: cardiology as scheduled Friday                      EARNEST Dorman  08/22/23      Time Spent on Discharge:  I spent  45  minutes on this discharge activity which included: face-to-face encounter with the patient, reviewing the data in the system, coordination of the care with the nursing staff as well as consultants, documentation, and entering orders.      Electronically signed by EARNEST Dorman, 08/22/23, 10:25 AM EDT.

## 2023-08-22 NOTE — PLAN OF CARE
Goal Outcome Evaluation:   A fib. Complaints of pain treated PRN this shift and seem to have resolved. PHILIP JONES. Son requested that patient not be DC today, he relayed that her ablation procedure at an outside facility is not until Friday and he is concerned that the patient PNA infection has not resolved, at this time RN told the son that we would relay this information to the next shift and that a provider may be reaching out to him today in regards to DC. Patient had no further complaints this shift.

## 2023-08-23 NOTE — OUTREACH NOTE
Prep Survey      Flowsheet Row Responses   [facility] facility patient discharged from? [place]   Is LACE score < 7 ? No   Eligibility Readm Mgmt   Discharge diagnosis *Pneumonia of right lower lobe due to infectious organis   Does the patient have one of the following disease processes/diagnoses(primary or secondary)? Pneumonia   Does the patient have Home health ordered? No   Is there a DME ordered? No   Prep survey completed? Yes            PRETTY DORSEY - Registered Nurse

## 2023-08-24 ENCOUNTER — READMISSION MANAGEMENT (OUTPATIENT)
Dept: CALL CENTER | Facility: HOSPITAL | Age: 78
End: 2023-08-24
Payer: MEDICARE

## 2023-08-25 LAB
BACTERIA SPEC AEROBE CULT: NORMAL
BACTERIA SPEC AEROBE CULT: NORMAL
QT INTERVAL: 340 MS
QTC INTERVAL: 476 MS

## 2023-10-19 ENCOUNTER — TELEPHONE (OUTPATIENT)
Dept: GASTROENTEROLOGY | Facility: CLINIC | Age: 78
End: 2023-10-19
Payer: MEDICARE

## 2023-10-19 NOTE — TELEPHONE ENCOUNTER
"Received transferred call from Aminata to assist with patient scheduling. Patient had cancelled her new patient appointment in fear that insurance would not cover her appointment.    Patient expressed that her  assured she was covered. I offered her an appointment 10/25 with Aleyda Stearns. Patient was reluctant to schedule because she still has growing concerns her insurance will \"not cover\" her appointment.    Patient requested we drop her request to schedule and she will seek care with a provider who is \"definitely covered.\"  "

## 2023-10-19 NOTE — TELEPHONE ENCOUNTER
Patient been in and out of the hospital at [facility]. Gi bleed. She still not feeling good. She accidentally cancelled her appointment with Aleyda for tomorrow. Transferred call to  to see if she can get back on Aleyda's schedule. Patient stated she is very confused.

## 2023-12-05 ENCOUNTER — PRIOR AUTHORIZATION (OUTPATIENT)
Dept: GASTROENTEROLOGY | Facility: CLINIC | Age: 78
End: 2023-12-05
Payer: MEDICARE

## 2023-12-05 NOTE — TELEPHONE ENCOUNTER
The PA is approved for/through 12-31-24 by the [facility]'s Pharmacy Coverage Policy

## 2024-05-20 ENCOUNTER — TELEPHONE (OUTPATIENT)
Dept: GASTROENTEROLOGY | Facility: CLINIC | Age: 79
End: 2024-05-20

## 2024-05-20 NOTE — TELEPHONE ENCOUNTER
Caller: Yenny Atkins    Relationship to patient: Self    Best call back number: [phone]    Patient is needing: PT IS ESTABLISHED WITH [facility], BUT PT HAD TO START GOING TO [facility] DUE TO [facility] BEING OUT OF NETWORK. PT WOULD LIKE TO COME BACK NOW THAT THE INSURANCE ISSUE IS RESOLVED. PLEASE ADVISE IF PT CAN SCHEDULE A F/U APPT.

## 2024-06-14 DIAGNOSIS — K22.70 BARRETT'S ESOPHAGUS WITHOUT DYSPLASIA: ICD-10-CM

## 2024-06-14 RX ORDER — PANTOPRAZOLE SODIUM 40 MG/1
40 TABLET, DELAYED RELEASE ORAL 2 TIMES DAILY
Qty: 180 TABLET | Refills: 3 | Status: SHIPPED | OUTPATIENT
Start: 2024-06-14

## 2024-06-14 NOTE — TELEPHONE ENCOUNTER
Rx Refill Note  Pending Prescriptions:                       Disp   Refills    pantoprazole (PROTONIX) 40 MG EC tablet [P*180 ta*3        Sig: TAKE 1 TABLET BY MOUTH TWICE A DAY    Last office visit with prescribing clinician: 7/5/2023   Last telemedicine visit with prescribing clinician: Visit date not found   Next office visit with prescribing clinician: 8/14/2024         Lissy Alexander MA  06/14/24, 08:08 EDT

## 2024-08-14 ENCOUNTER — LAB (OUTPATIENT)
Dept: LAB | Facility: HOSPITAL | Age: 79
End: 2024-08-14

## 2024-08-14 ENCOUNTER — OFFICE VISIT (OUTPATIENT)
Dept: GASTROENTEROLOGY | Facility: CLINIC | Age: 79
End: 2024-08-14
Payer: MEDICARE

## 2024-08-14 VITALS
TEMPERATURE: 97.3 F | WEIGHT: 144 LBS | DIASTOLIC BLOOD PRESSURE: 50 MMHG | BODY MASS INDEX: 25.52 KG/M2 | HEART RATE: 56 BPM | OXYGEN SATURATION: 98 % | HEIGHT: 63 IN | SYSTOLIC BLOOD PRESSURE: 100 MMHG

## 2024-08-14 DIAGNOSIS — K75.81 LIVER CIRRHOSIS SECONDARY TO NASH: ICD-10-CM

## 2024-08-14 DIAGNOSIS — K76.82 HEPATIC ENCEPHALOPATHY: ICD-10-CM

## 2024-08-14 DIAGNOSIS — K74.60 LIVER CIRRHOSIS SECONDARY TO NASH: ICD-10-CM

## 2024-08-14 DIAGNOSIS — K74.60 LIVER CIRRHOSIS SECONDARY TO NASH: Primary | ICD-10-CM

## 2024-08-14 DIAGNOSIS — K22.70 BARRETT'S ESOPHAGUS WITHOUT DYSPLASIA: ICD-10-CM

## 2024-08-14 DIAGNOSIS — K92.2 GASTROINTESTINAL HEMORRHAGE, UNSPECIFIED GASTROINTESTINAL HEMORRHAGE TYPE: ICD-10-CM

## 2024-08-14 DIAGNOSIS — K75.81 LIVER CIRRHOSIS SECONDARY TO NASH: Primary | ICD-10-CM

## 2024-08-14 LAB
INR PPP: 2.11 (ref 0.89–1.12)
PROTHROMBIN TIME: 23.7 SECONDS (ref 12.2–14.5)

## 2024-08-14 PROCEDURE — 85610 PROTHROMBIN TIME: CPT

## 2024-08-14 PROCEDURE — G2211 COMPLEX E/M VISIT ADD ON: HCPCS | Performed by: NURSE PRACTITIONER

## 2024-08-14 PROCEDURE — 99214 OFFICE O/P EST MOD 30 MIN: CPT | Performed by: NURSE PRACTITIONER

## 2024-08-14 PROCEDURE — 36415 COLL VENOUS BLD VENIPUNCTURE: CPT

## 2024-08-14 PROCEDURE — 85025 COMPLETE CBC W/AUTO DIFF WBC: CPT

## 2024-08-14 PROCEDURE — 1159F MED LIST DOCD IN RCRD: CPT | Performed by: NURSE PRACTITIONER

## 2024-08-14 PROCEDURE — 3074F SYST BP LT 130 MM HG: CPT | Performed by: NURSE PRACTITIONER

## 2024-08-14 PROCEDURE — 1160F RVW MEDS BY RX/DR IN RCRD: CPT | Performed by: NURSE PRACTITIONER

## 2024-08-14 PROCEDURE — 3078F DIAST BP <80 MM HG: CPT | Performed by: NURSE PRACTITIONER

## 2024-08-14 PROCEDURE — 80053 COMPREHEN METABOLIC PANEL: CPT

## 2024-08-14 RX ORDER — PANTOPRAZOLE SODIUM 40 MG/1
40 TABLET, DELAYED RELEASE ORAL 2 TIMES DAILY
Qty: 180 TABLET | Refills: 3 | Status: SHIPPED | OUTPATIENT
Start: 2024-08-14

## 2024-08-14 RX ORDER — ALLOPURINOL 300 MG/1
1 TABLET ORAL DAILY
COMMUNITY
Start: 2024-05-30

## 2024-08-14 RX ORDER — GABAPENTIN 300 MG/1
600 CAPSULE ORAL DAILY
COMMUNITY

## 2024-08-14 RX ORDER — GABAPENTIN 100 MG/1
CAPSULE ORAL
COMMUNITY
Start: 2024-07-02

## 2024-08-14 RX ORDER — MOLNUPIRAVIR 200 MG/1
4 CAPSULE ORAL EVERY 12 HOURS SCHEDULED
COMMUNITY
Start: 2024-07-12

## 2024-08-14 RX ORDER — AMIODARONE HYDROCHLORIDE 400 MG/1
400 TABLET ORAL DAILY
COMMUNITY

## 2024-08-14 RX ORDER — AMLODIPINE BESYLATE 10 MG/1
1 TABLET ORAL DAILY
COMMUNITY
Start: 2024-06-14

## 2024-08-14 RX ORDER — ASPIRIN 81 MG/1
81 TABLET ORAL DAILY
COMMUNITY

## 2024-08-14 RX ORDER — POTASSIUM CHLORIDE 1500 MG/1
1 TABLET, EXTENDED RELEASE ORAL EVERY 12 HOURS SCHEDULED
COMMUNITY
Start: 2024-06-04

## 2024-08-14 RX ORDER — CALCIUM CARBONATE/VITAMIN D3 500-10/5ML
400 LIQUID (ML) ORAL DAILY
COMMUNITY

## 2024-08-14 RX ORDER — SEMAGLUTIDE 1.34 MG/ML
INJECTION, SOLUTION SUBCUTANEOUS
COMMUNITY
End: 2024-08-14 | Stop reason: ALTCHOICE

## 2024-08-14 RX ORDER — INSULIN ASPART 100 [IU]/ML
30 INJECTION, SUSPENSION SUBCUTANEOUS
COMMUNITY

## 2024-08-14 NOTE — PROGRESS NOTES
Follow Up      Patient Name: Yenny Atkins  : 1945   MRN: [id number]     Chief Complaint:    Chief Complaint   Patient presents with    Follow-up       History of Present Illness: Yenny Atkins is a 79 y.o. female who is here today for follow up on CASE cirrhosis and Erwin's      Has been admitted multiple times over the past year since our last visit. for various ailments including heart failure and upper GI bleed (While on xarelto- was treated at [facility]) last October. She reports no source of GI blood loss but did not have an EGD.  She did not see any blood in the stool but was anemic.       Having a bm about 3-4 times a day. No blood in the stool. No jaundice or ascites.  There is some mild LE edema.  No itching.      She denies confusion- compliant with xifaxan. Trying to get protein in her diet. Following diabetic diet. Avoiding NSAIDs.     Taking BID PPI- symptoms controlled- no breakthrough, no dysphagia.      She does not eat much salt.    UTD on DEXA- about 1 year ago- no osteoporosis  She does have iron deficiency anemia, vitamin D deficiency, and B12 deficiency-she is taking supplements for these     UPPER GI ENDOSCOPY (2022 13:12)-(Dr Gooden)- mild hiatal hernia, mucosal changes suspicious for short segment Erwin's, biopsied, portal hypertensive gastropathy, gastritis.  Biopsies consistent with Erwin's, negative for H. pylori and celiac  COLONOSCOPY (2022 13:11)-(Dr Gooden)--congested mucosa throughout consistent with portal colopathy, nonbleeding internal hemorrhoids    She is on xarelto.      History of colon resection     Subjective      Review of Systems:   Review of Systems   Constitutional:  Negative for appetite change, unexpected weight gain and unexpected weight loss.   Cardiovascular:  Positive for leg swelling.   Gastrointestinal:  Positive for abdominal distention and diarrhea. Negative for constipation.        Bloating   Skin:  Negative for color  change and skin lesions.   Neurological:  Positive for memory problem. Negative for weakness and confusion.       Medications:     Current Outpatient Medications:     allopurinol (ZYLOPRIM) 300 MG tablet, Take 1 tablet by mouth Daily., Disp: , Rfl:     amiodarone (PACERONE) 400 MG tablet, Take 1 tablet by mouth Daily., Disp: , Rfl:     amLODIPine (NORVASC) 10 MG tablet, Take 1 tablet by mouth Daily., Disp: , Rfl:     aspirin 81 MG EC tablet, Take 1 tablet by mouth Daily., Disp: , Rfl:     BD Pen Needle Armida 2nd Gen 32G X 4 MM misc, USE AS DIRECTED TWICE A DAY WITH INJECTIONS, Disp: , Rfl:     Blood Glucose Monitoring Suppl (Accu-Chek Guide Me) w/Device kit, 1 each by Other route 3 (Three) Times a Day. use to test blood sugar 3 times daily, Disp: , Rfl:     Cyanocobalamin ER 1000 MCG tablet controlled-release, Take 1,000 mcg by mouth Every 30 (Thirty) Days., Disp: , Rfl:     ergocalciferol (ERGOCALCIFEROL) 1.25 MG (68399 UT) capsule, ergocalciferol (vitamin D2) 1,250 mcg (50,000 unit) capsule  TAKE 1 CAPSULE BY MOUTH ONE TIME PER WEEK, Disp: , Rfl:     ferrous sulfate 325 (65 FE) MG tablet, Take 1 tablet by mouth Daily With Breakfast., Disp: 30 tablet, Rfl: 0    furosemide (LASIX) 40 MG tablet, Take 1 tablet by mouth Every Morning., Disp: , Rfl:     gabapentin (NEURONTIN) 100 MG capsule, TAKE 1 CAPSULE BY MOUTH ONCE DAILY AT BEDTIME FOR NEUROPATHY, Disp: , Rfl:     gabapentin (NEURONTIN) 300 MG capsule, Take 1 capsule by mouth 3 (Three) Times a Day., Disp: , Rfl:     gabapentin (NEURONTIN) 300 MG capsule, Take 2 capsules by mouth Daily., Disp: , Rfl:     glucose blood (OneTouch Verio) test strip, qd Dx code: e11.65, Disp: 100 each, Rfl: 3    guaiFENesin (MUCINEX) 600 MG 12 hr tablet, Take 1 tablet by mouth Every 12 (Twelve) Hours., Disp: 20 tablet, Rfl: 0    insulin aspart prot & aspart (NovoLOG Mix 70/30 FlexPen) (70-30) 100 UNIT/ML suspension pen-injector injection, Novolog Mix 70-30 FlexPen U-100 Insulin 100  unit/mL subcutaneous pen  Administer 20 units before breakfast and 20 units before dinner, Disp: , Rfl:     insulin aspart prot-insulin aspart (novoLOG 70/30) (70-30) 100 UNIT/ML injection, Inject 30 Units under the skin into the appropriate area as directed., Disp: , Rfl:     insulin aspart protamine & aspart (NOVOLOG) 70/30 100 unit/mL, Inject 0.24 mL under the skin into the appropriate area as directed., Disp: , Rfl:     Lagevrio 200 MG capsule, Take 4 capsules by mouth Every 12 (Twelve) Hours., Disp: , Rfl:     levothyroxine (SYNTHROID, LEVOTHROID) 50 MCG tablet, levothyroxine 50 mcg tablet  TAKE 1 TABLET BY MOUTH EVERY DAY, Disp: , Rfl:     magnesium oxide (MAG-OX) 400 MG tablet, Daily., Disp: , Rfl:     Magnesium Oxide 400 MG capsule, Take 400 mg by mouth Daily., Disp: , Rfl:     memantine (NAMENDA) 10 MG tablet, Take 1 tablet by mouth 2 (Two) Times a Day., Disp: , Rfl:     metoprolol succinate XL (TOPROL-XL) 50 MG 24 hr tablet, Take 1 tablet by mouth 2 (Two) Times a Day., Disp: 60 tablet, Rfl: 0    OneTouch Delica Lancets 33G misc, testing 1x per day; E11.65, Disp: 100 each, Rfl: 3    pantoprazole (PROTONIX) 40 MG EC tablet, Take 1 tablet by mouth 2 (Two) Times a Day., Disp: 180 tablet, Rfl: 3    potassium chloride (K-DUR,KLOR-CON) 20 MEQ CR tablet, Take 1 tablet by mouth Daily., Disp: 30 tablet, Rfl: 0    potassium chloride ER (K-TAB) 20 MEQ tablet controlled-release ER tablet, Take 1 tablet by mouth Every 12 (Twelve) Hours., Disp: , Rfl:     pramipexole (MIRAPEX) 0.5 MG tablet, Take 1 tablet by mouth Daily., Disp: , Rfl:     riFAXIMin (XIFAXAN) 550 MG tablet, Take 1 tablet by mouth 2 (Two) Times a Day., Disp: 60 tablet, Rfl: 11    Xarelto 15 MG tablet, Take 1 tablet by mouth Daily., Disp: , Rfl:     Allergies:   Allergies   Allergen Reactions    Diltiazem Shortness Of Breath    Levofloxacin Anaphylaxis and Other (See Comments)     Couldn't breath    Acetaminophen Other (See Comments)     Feels crazy when  "taking tylenol    Other reaction(s): Other (See Comments), Other: See Comments   \"feels funny\"   Feels crazy when taking tylenol    Promethazine Other (See Comments)     \"feels funny\"    Promethazine Hcl Mental Status Change    Other Hives and Rash       Social History:   Social History     Socioeconomic History    Marital status:    Tobacco Use    Smoking status: Never    Smokeless tobacco: Never   Vaping Use    Vaping status: Never Used   Substance and Sexual Activity    Alcohol use: No    Drug use: No    Sexual activity: Defer        Surgical History:   Past Surgical History:   Procedure Laterality Date    [facility]ON RESE[facility]      [facility]    [facility]OSCOPY  08/08/2019    DR CONTRERAS [facility]    COLONOSCOPY N/A 11/1/2022    Procedure: COLONOSCOPY;  Surgeon: Harry Gooden MD;  Location:  [facility];  Service: Gastroenterology;  Laterality: N/A;    ENDOSCOPY N/A 11/1/2022    Procedure: ESOPHAGOGASTRODUODENOSCOPY;  Surgeon: Harry Gooden MD;  Location:  [facility];  Service: Gastroenterology;  Laterality: N/A;    KNEE SURGERY Right     TWICE    UPPER GASTROINTESTINAL ENDOSCOPY          Medical History:   Past Medical History:   Diagnosis Date    Atrial fibrillation     Congenital hyperrotation     Crohn disease     Fatty liver     GERD (gastroesophageal reflux disease)     Hypertension     Hypothyroidism     Neuropathy     Non-alcoholic cirrhosis     Overweight (BMI 25.0-29.9)     Restless leg syndrome     Type 2 diabetes mellitus         Objective     Physical Exam:  Vital Signs:   Vitals:    08/14/24 1459   BP: 100/50   Pulse: 56   Temp: 97.3 °F (36.3 °C)   SpO2: 98%   Weight: 65.3 kg (144 lb)   Height: 160 cm (62.99\")     Body mass index is 25.51 kg/m².     Physical Exam  Vitals and nursing note reviewed.   Constitutional:       General: She is not in acute distress.     Appearance: She is well-developed. She is not diaphoretic.   Eyes:      General: " No scleral icterus.     Conjunctiva/sclera: Conjunctivae normal.   Neck:      Thyroid: No thyromegaly.   Cardiovascular:      Rate and Rhythm: Normal rate and regular rhythm.   Pulmonary:      Effort: Pulmonary effort is normal.      Breath sounds: Normal breath sounds.   Abdominal:      General: Bowel sounds are normal. There is distension.      Palpations: Abdomen is soft. There is no hepatomegaly or splenomegaly.      Tenderness: There is no abdominal tenderness. There is no guarding or rebound.      Hernia: No hernia is present.   Musculoskeletal:      Cervical back: Neck supple.      Right lower leg: No edema.      Left lower leg: No edema.      Comments: No edema on exam   Skin:     General: Skin is warm and dry.      Capillary Refill: Capillary refill takes 2 to 3 seconds.      Coloration: Skin is not jaundiced or pale.      Findings: No bruising or petechiae.      Nails: There is no clubbing.      Comments: No palmar erythema   Neurological:      Mental Status: She is alert and oriented to person, place, and time.      Comments: Negative asterixis   Psychiatric:         Behavior: Behavior normal.         Thought Content: Thought content normal.         Judgment: Judgment normal.         Assessment / Plan      Assessment/Plan:   Diagnoses and all orders for this visit:    1. 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  Possible occult HE - on xifaxan-previously on Namenda for dementia  -cirrhosis secondary to CASE  -Low sodium diet   - Increase protein intake  -Avoid alcohol, avoid NSAIDS, avoid raw shellfish  - Hepatocellular carcinoma surveillance ordered today  - Abdominal imaging every six months  - Last abdominal US 9/22.   -She is up-to-date on all of her immunizations and her DEXA scan  2. Gastrointestinal hemorrhage,  unspecified gastrointestinal hemorrhage type  -     Ambulatory referral for Screening EGD  Supposed GI bleed last fall without any EGD- continue ppi,  Not a candidate for nadolol at this time due to BP/ HR.   3. Hepatic encephalopathy  -     riFAXIMin (XIFAXAN) 550 MG tablet; Take 1 tablet by mouth 2 (Two) Times a Day.  Dispense: 60 tablet; Refill: 11    4. 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  Dispense: 180 tablet; Refill: 3         Follow Up:   Return in about 3 months (around 11/14/2024), or if symptoms worsen or fail to improve.    Plan of care reviewed with the patient at the conclusion of today's visit.  Education was provided regarding diagnosis, management, and any prescribed or recommended OTC medications.  Patient verbalized understanding of and agreement with management plan.       EARNEST Armijo  [facility]

## 2024-08-15 ENCOUNTER — TELEPHONE (OUTPATIENT)
Dept: GASTROENTEROLOGY | Facility: CLINIC | Age: 79
End: 2024-08-15
Payer: MEDICARE

## 2024-08-15 ENCOUNTER — PREP FOR SURGERY (OUTPATIENT)
Dept: OTHER | Facility: HOSPITAL | Age: 79
End: 2024-08-15
Payer: MEDICARE

## 2024-08-15 ENCOUNTER — PATIENT MESSAGE (OUTPATIENT)
Dept: GASTROENTEROLOGY | Facility: CLINIC | Age: 79
End: 2024-08-15
Payer: MEDICARE

## 2024-08-15 DIAGNOSIS — K92.2 GASTROINTESTINAL HEMORRHAGE, UNSPECIFIED GASTROINTESTINAL HEMORRHAGE TYPE: ICD-10-CM

## 2024-08-15 DIAGNOSIS — K75.81 LIVER CIRRHOSIS SECONDARY TO NASH: Primary | ICD-10-CM

## 2024-08-15 DIAGNOSIS — K74.60 LIVER CIRRHOSIS SECONDARY TO NASH: Primary | ICD-10-CM

## 2024-08-15 LAB
ALBUMIN SERPL-MCNC: 4 G/DL (ref 3.5–5.2)
ALBUMIN/GLOB SERPL: 1.3 G/DL
ALP SERPL-CCNC: 65 U/L (ref 39–117)
ALT SERPL W P-5'-P-CCNC: 9 U/L (ref 1–33)
ANION GAP SERPL CALCULATED.3IONS-SCNC: 8.9 MMOL/L (ref 5–15)
AST SERPL-CCNC: 18 U/L (ref 1–32)
BASOPHILS # BLD AUTO: 0.02 10*3/MM3 (ref 0–0.2)
BASOPHILS NFR BLD AUTO: 0.4 % (ref 0–1.5)
BILIRUB SERPL-MCNC: 0.8 MG/DL (ref 0–1.2)
BUN SERPL-MCNC: 23 MG/DL (ref 8–23)
BUN/CREAT SERPL: 18.1 (ref 7–25)
CALCIUM SPEC-SCNC: 9.6 MG/DL (ref 8.6–10.5)
CHLORIDE SERPL-SCNC: 108 MMOL/L (ref 98–107)
CO2 SERPL-SCNC: 22.1 MMOL/L (ref 22–29)
CREAT SERPL-MCNC: 1.27 MG/DL (ref 0.57–1)
DEPRECATED RDW RBC AUTO: 42.9 FL (ref 37–54)
EGFRCR SERPLBLD CKD-EPI 2021: 42.6 ML/MIN/1.73
EOSINOPHIL # BLD AUTO: 0.07 10*3/MM3 (ref 0–0.4)
EOSINOPHIL NFR BLD AUTO: 1.4 % (ref 0.3–6.2)
ERYTHROCYTE [DISTWIDTH] IN BLOOD BY AUTOMATED COUNT: 15 % (ref 12.3–15.4)
GLOBULIN UR ELPH-MCNC: 3 GM/DL
GLUCOSE SERPL-MCNC: 100 MG/DL (ref 65–99)
HCT VFR BLD AUTO: 28.9 % (ref 34–46.6)
HGB BLD-MCNC: 9.2 G/DL (ref 12–15.9)
IMM GRANULOCYTES # BLD AUTO: 0.04 10*3/MM3 (ref 0–0.05)
IMM GRANULOCYTES NFR BLD AUTO: 0.8 % (ref 0–0.5)
LYMPHOCYTES # BLD AUTO: 0.85 10*3/MM3 (ref 0.7–3.1)
LYMPHOCYTES NFR BLD AUTO: 16.9 % (ref 19.6–45.3)
MCH RBC QN AUTO: 25.9 PG (ref 26.6–33)
MCHC RBC AUTO-ENTMCNC: 31.8 G/DL (ref 31.5–35.7)
MCV RBC AUTO: 81.4 FL (ref 79–97)
MONOCYTES # BLD AUTO: 0.28 10*3/MM3 (ref 0.1–0.9)
MONOCYTES NFR BLD AUTO: 5.6 % (ref 5–12)
NEUTROPHILS NFR BLD AUTO: 3.77 10*3/MM3 (ref 1.7–7)
NEUTROPHILS NFR BLD AUTO: 74.9 % (ref 42.7–76)
NRBC BLD AUTO-RTO: 0 /100 WBC (ref 0–0.2)
PLATELET # BLD AUTO: 104 10*3/MM3 (ref 140–450)
PMV BLD AUTO: 12.1 FL (ref 6–12)
POTASSIUM SERPL-SCNC: 4.5 MMOL/L (ref 3.5–5.2)
PROT SERPL-MCNC: 7 G/DL (ref 6–8.5)
RBC # BLD AUTO: 3.55 10*6/MM3 (ref 3.77–5.28)
SODIUM SERPL-SCNC: 139 MMOL/L (ref 136–145)
WBC NRBC COR # BLD AUTO: 5.03 10*3/MM3 (ref 3.4–10.8)

## 2024-08-21 NOTE — TELEPHONE ENCOUNTER
PATIENT STATES SHE HAS HER NEXT APPT WITH CARDIOOLOGY IN DECEMBER, SHE WILL CALL THEM TO DISCUSS MEDICATION CHANGE OR IF APPT NEEDS TO BE MOVED UP.    PATIENT STATES SHE WILL START OTC IRON AND VITAMIN C.    PATIENT WILL DISCUSS LAB RESULTS WITH HER NEPHROLOGIST.  RESULTS MAILED TO PATIENT.    PATIENT WILL CALL BACK WITH ANY QUESTIONS.

## 2024-08-23 ENCOUNTER — TELEPHONE (OUTPATIENT)
Dept: GASTROENTEROLOGY | Facility: CLINIC | Age: 79
End: 2024-08-23

## 2024-09-03 ENCOUNTER — TELEPHONE (OUTPATIENT)
Dept: GASTROENTEROLOGY | Facility: CLINIC | Age: 79
End: 2024-09-03

## 2024-09-03 PROBLEM — K75.81 LIVER CIRRHOSIS SECONDARY TO NASH: Status: ACTIVE | Noted: 2024-08-15

## 2024-09-03 PROBLEM — K92.2 GASTROINTESTINAL HEMORRHAGE: Status: ACTIVE | Noted: 2024-08-15

## 2024-09-03 PROBLEM — K74.60 LIVER CIRRHOSIS SECONDARY TO NASH: Status: ACTIVE | Noted: 2024-08-15

## 2024-09-03 NOTE — TELEPHONE ENCOUNTER
PATIENT SCHEDULED FOR THE FOLLOWING, NEED CARDIAC CLEARANCE:    PAT 11/1/24 @2:30 PM  EGD ENDO 11/8/24 ARRIVAL TIME OF 6:30 AM

## 2024-09-17 ENCOUNTER — OFFICE VISIT (OUTPATIENT)
Dept: GASTROENTEROLOGY | Facility: CLINIC | Age: 79
End: 2024-09-17
Payer: MEDICARE

## 2024-09-17 VITALS
TEMPERATURE: 97.6 F | OXYGEN SATURATION: 98 % | WEIGHT: 144 LBS | DIASTOLIC BLOOD PRESSURE: 58 MMHG | SYSTOLIC BLOOD PRESSURE: 120 MMHG | HEIGHT: 63 IN | BODY MASS INDEX: 25.52 KG/M2 | HEART RATE: 76 BPM

## 2024-09-17 DIAGNOSIS — K92.2 GASTROINTESTINAL HEMORRHAGE, UNSPECIFIED GASTROINTESTINAL HEMORRHAGE TYPE: ICD-10-CM

## 2024-09-17 DIAGNOSIS — K75.81 LIVER CIRRHOSIS SECONDARY TO NASH: Primary | ICD-10-CM

## 2024-09-17 DIAGNOSIS — K76.82 HEPATIC ENCEPHALOPATHY: ICD-10-CM

## 2024-09-17 DIAGNOSIS — K74.60 LIVER CIRRHOSIS SECONDARY TO NASH: Primary | ICD-10-CM

## 2024-09-17 DIAGNOSIS — K22.70 BARRETT'S ESOPHAGUS WITHOUT DYSPLASIA: ICD-10-CM

## 2024-09-17 PROCEDURE — 1159F MED LIST DOCD IN RCRD: CPT | Performed by: NURSE PRACTITIONER

## 2024-09-17 PROCEDURE — 1160F RVW MEDS BY RX/DR IN RCRD: CPT | Performed by: NURSE PRACTITIONER

## 2024-09-17 PROCEDURE — 3074F SYST BP LT 130 MM HG: CPT | Performed by: NURSE PRACTITIONER

## 2024-09-17 PROCEDURE — G2211 COMPLEX E/M VISIT ADD ON: HCPCS | Performed by: NURSE PRACTITIONER

## 2024-09-17 PROCEDURE — 3078F DIAST BP <80 MM HG: CPT | Performed by: NURSE PRACTITIONER

## 2024-09-17 PROCEDURE — 99214 OFFICE O/P EST MOD 30 MIN: CPT | Performed by: NURSE PRACTITIONER

## 2024-09-17 RX ORDER — METOPROLOL SUCCINATE 50 MG/1
1 TABLET, EXTENDED RELEASE ORAL 2 TIMES DAILY
COMMUNITY
End: 2024-09-17

## 2024-09-17 RX ORDER — METOPROLOL SUCCINATE 25 MG/1
1 TABLET, EXTENDED RELEASE ORAL EVERY EVENING
COMMUNITY
End: 2024-09-17

## 2024-09-17 RX ORDER — GABAPENTIN 100 MG/1
100 CAPSULE ORAL 3 TIMES DAILY
COMMUNITY

## 2024-09-17 RX ORDER — ONDANSETRON 4 MG/1
TABLET, ORALLY DISINTEGRATING ORAL
COMMUNITY

## 2024-09-17 RX ORDER — NADOLOL 20 MG/1
TABLET ORAL
COMMUNITY
End: 2024-09-17

## 2024-09-17 RX ORDER — METOPROLOL TARTRATE 25 MG/1
1 TABLET, FILM COATED ORAL 2 TIMES DAILY
COMMUNITY
End: 2024-09-17

## 2024-09-17 RX ORDER — ACETAMINOPHEN 500 MG
1000 TABLET ORAL
COMMUNITY
Start: 2024-09-12 | End: 2024-09-22

## 2024-09-17 RX ORDER — FERROUS SULFATE 325(65) MG
1 TABLET ORAL 2 TIMES DAILY
COMMUNITY

## 2024-09-17 RX ORDER — HYDROXYZINE HYDROCHLORIDE 25 MG/1
TABLET, FILM COATED ORAL
COMMUNITY

## 2024-09-17 RX ORDER — METOPROLOL TARTRATE 50 MG
1 TABLET ORAL 2 TIMES DAILY
COMMUNITY
End: 2024-09-17

## 2024-09-17 RX ORDER — CEFUROXIME AXETIL 500 MG/1
500 TABLET ORAL
COMMUNITY
Start: 2024-09-12 | End: 2024-09-17

## 2024-10-25 ENCOUNTER — TELEPHONE (OUTPATIENT)
Dept: GASTROENTEROLOGY | Facility: CLINIC | Age: 79
End: 2024-10-25
Payer: MEDICARE

## 2024-10-29 ENCOUNTER — TELEPHONE (OUTPATIENT)
Dept: GASTROENTEROLOGY | Facility: CLINIC | Age: 79
End: 2024-10-29

## 2024-10-29 NOTE — TELEPHONE ENCOUNTER
"Patient called, asking if she needs to follow up with Dr. Gooden. Patient was scheduled for EGD in November in [facility], but she cancelled it. Patient has history of GI bleeds - stated she recently was hospitalized at [facility] in [place] late September, was told to follow up with a doctor but she doesn't want to - stated she wants to follow up with Dr. Gooden only. I told patient the follow up recommendations depends on the records we receive from [facility] - rather she needs to follow with a GI or surgeon. Patient could not confirm what \"surgery\" she stated she had. Patient did confirmed she does not have any active bleeding signs, only diarrhea with normal color - which has been ongoing for years. Patient understands until we receive inpatient records, I will speak to GMW and update patient by the end of the week.   "

## 2024-12-20 ENCOUNTER — TELEPHONE (OUTPATIENT)
Dept: GASTROENTEROLOGY | Facility: CLINIC | Age: 79
End: 2024-12-20
Payer: MEDICARE

## 2024-12-20 NOTE — TELEPHONE ENCOUNTER
Patient was scheduled for an appt with Aleyda. Rescheduled with [place]. Patient wants to know if they need to do their US liver before that appt

## 2025-01-30 ENCOUNTER — LAB (OUTPATIENT)
Dept: LAB | Facility: HOSPITAL | Age: 80
End: 2025-01-30
Payer: MEDICARE

## 2025-01-30 ENCOUNTER — OFFICE VISIT (OUTPATIENT)
Dept: GASTROENTEROLOGY | Facility: CLINIC | Age: 80
End: 2025-01-30
Payer: COMMERCIAL

## 2025-01-30 VITALS
RESPIRATION RATE: 24 BRPM | HEIGHT: 63 IN | WEIGHT: 149 LBS | HEART RATE: 76 BPM | OXYGEN SATURATION: 98 % | DIASTOLIC BLOOD PRESSURE: 96 MMHG | TEMPERATURE: 97.8 F | BODY MASS INDEX: 26.4 KG/M2 | SYSTOLIC BLOOD PRESSURE: 138 MMHG

## 2025-01-30 DIAGNOSIS — K75.81 LIVER CIRRHOSIS SECONDARY TO NASH: Primary | ICD-10-CM

## 2025-01-30 DIAGNOSIS — K21.9 GASTROESOPHAGEAL REFLUX DISEASE, UNSPECIFIED WHETHER ESOPHAGITIS PRESENT: ICD-10-CM

## 2025-01-30 DIAGNOSIS — K31.7 GASTRIC POLYP: ICD-10-CM

## 2025-01-30 DIAGNOSIS — K74.60 LIVER CIRRHOSIS SECONDARY TO NASH: ICD-10-CM

## 2025-01-30 DIAGNOSIS — K74.60 LIVER CIRRHOSIS SECONDARY TO NASH: Primary | ICD-10-CM

## 2025-01-30 DIAGNOSIS — K75.81 LIVER CIRRHOSIS SECONDARY TO NASH: ICD-10-CM

## 2025-01-30 DIAGNOSIS — K76.82 HEPATIC ENCEPHALOPATHY: ICD-10-CM

## 2025-01-30 LAB
25(OH)D3 SERPL-MCNC: 58 NG/ML (ref 30–100)
ALBUMIN SERPL-MCNC: 4.2 G/DL (ref 3.5–5.2)
ALBUMIN/GLOB SERPL: 1.3 G/DL
ALP SERPL-CCNC: 57 U/L (ref 39–117)
ALPHA-FETOPROTEIN: 2.67 NG/ML (ref 0–8.3)
ALT SERPL W P-5'-P-CCNC: 10 U/L (ref 1–33)
ANION GAP SERPL CALCULATED.3IONS-SCNC: 11.7 MMOL/L (ref 5–15)
AST SERPL-CCNC: 24 U/L (ref 1–32)
BILIRUB SERPL-MCNC: 2.5 MG/DL (ref 0–1.2)
BUN SERPL-MCNC: 39 MG/DL (ref 8–23)
BUN/CREAT SERPL: 22.2 (ref 7–25)
CALCIUM SPEC-SCNC: 9.6 MG/DL (ref 8.6–10.5)
CHLORIDE SERPL-SCNC: 104 MMOL/L (ref 98–107)
CO2 SERPL-SCNC: 24.3 MMOL/L (ref 22–29)
CREAT SERPL-MCNC: 1.76 MG/DL (ref 0.57–1)
DEPRECATED RDW RBC AUTO: 43.4 FL (ref 37–54)
EGFRCR SERPLBLD CKD-EPI 2021: 28.8 ML/MIN/1.73
ERYTHROCYTE [DISTWIDTH] IN BLOOD BY AUTOMATED COUNT: 14.3 % (ref 12.3–15.4)
GLOBULIN UR ELPH-MCNC: 3.3 GM/DL
GLUCOSE SERPL-MCNC: 171 MG/DL (ref 65–99)
HCT VFR BLD AUTO: 33 % (ref 34–46.6)
HGB BLD-MCNC: 10.7 G/DL (ref 12–15.9)
INR PPP: 1.11 (ref 0.89–1.12)
IRON 24H UR-MRATE: 104 MCG/DL (ref 37–145)
IRON SATN MFR SERPL: 22 % (ref 20–50)
MCH RBC QN AUTO: 27.5 PG (ref 26.6–33)
MCHC RBC AUTO-ENTMCNC: 32.4 G/DL (ref 31.5–35.7)
MCV RBC AUTO: 84.8 FL (ref 79–97)
PLATELET # BLD AUTO: 103 10*3/MM3 (ref 140–450)
PMV BLD AUTO: 11.7 FL (ref 6–12)
POTASSIUM SERPL-SCNC: 4.7 MMOL/L (ref 3.5–5.2)
PROT SERPL-MCNC: 7.5 G/DL (ref 6–8.5)
PROTHROMBIN TIME: 14.4 SECONDS (ref 12.2–14.5)
RBC # BLD AUTO: 3.89 10*6/MM3 (ref 3.77–5.28)
SODIUM SERPL-SCNC: 140 MMOL/L (ref 136–145)
TIBC SERPL-MCNC: 477 MCG/DL (ref 298–536)
TRANSFERRIN SERPL-MCNC: 320 MG/DL (ref 200–360)
VIT B12 BLD-MCNC: 424 PG/ML (ref 211–946)
WBC NRBC COR # BLD AUTO: 6.61 10*3/MM3 (ref 3.4–10.8)

## 2025-01-30 PROCEDURE — 82607 VITAMIN B-12: CPT | Performed by: NURSE PRACTITIONER

## 2025-01-30 PROCEDURE — 85027 COMPLETE CBC AUTOMATED: CPT | Performed by: NURSE PRACTITIONER

## 2025-01-30 PROCEDURE — 85610 PROTHROMBIN TIME: CPT | Performed by: NURSE PRACTITIONER

## 2025-01-30 PROCEDURE — 80053 COMPREHEN METABOLIC PANEL: CPT | Performed by: NURSE PRACTITIONER

## 2025-01-30 PROCEDURE — 36415 COLL VENOUS BLD VENIPUNCTURE: CPT | Performed by: NURSE PRACTITIONER

## 2025-01-30 PROCEDURE — 82306 VITAMIN D 25 HYDROXY: CPT | Performed by: NURSE PRACTITIONER

## 2025-01-30 PROCEDURE — 84466 ASSAY OF TRANSFERRIN: CPT

## 2025-01-30 PROCEDURE — 82105 ALPHA-FETOPROTEIN SERUM: CPT | Performed by: NURSE PRACTITIONER

## 2025-01-30 PROCEDURE — 83540 ASSAY OF IRON: CPT

## 2025-01-30 RX ORDER — AMIODARONE HYDROCHLORIDE 200 MG/1
200 TABLET ORAL DAILY
COMMUNITY

## 2025-01-30 NOTE — PROGRESS NOTES
Follow Up      Patient Name: Yenny Atkins  : 1945   MRN: [id number]     Chief Complaint:  No chief complaint on file.  Follow-up CAES, HE    History of Present Illness: Yenny Atkins is a 79 y.o. female who is here today for follow up on CASE cirrhosis and hepatic encephalopathy.    Patient presents to clinic for follow-up on her decompensated CASE liver cirrhosis.    Patient notes she has been doing okay since her last office visit with Ms. Stearns.  Does report to me that she did have a life-threatening GI bleed in September of last year that was managed at [facility]; EGD was completed per Dr. Clem Betancourt who found a bleeding pedunculated gastric polyp that was resected and clipped at that time.  Notes that the clip came off while she was at home she had to have a secondary EGD shortly thereafter at [facility].  Patient does not report any ongoing dark or tarry stools.  Does not report any issues with nausea, vomiting, diarrhea.  Does not endorse any abdominal pain.  No issues with worsening confusion or asterixis.  Patient does report that her appetite has been waxing and waning and that she has not been using any over-the-counter protein supplements.  Patient notes she quit taking Xifaxan as she did not see its benefit.    Patient also reports that she had a cardioversion yesterday for a flutter; no further palpitations and is feeling well today.    Past medical, surgical, social, and family histories are reviewed for accuracy.  No documented alleviating or exacerbating factors.  Does not endorse pain at time of exam.    EGD in 2024 at [place] per Dr. Rosen; found to have bleeding from pedunculated polyp- resected and clipped. No varices at that time. Was on Xarelto at time. Polyp was hyperplastic on path.      23 10:11   US LIVER Stable morphologic changes of parenchymal disease and steatosis. No focal suspicious hepatic lesion.    Rpt: View report in Results Review for  more information    Subjective      Review of Systems:   Review of Systems   Unable to perform ROS: Other     Medications:     Current Outpatient Medications:     allopurinol (ZYLOPRIM) 300 MG tablet, Take 1 tablet by mouth Daily., Disp: , Rfl:     aspirin 81 MG EC tablet, Take 1 tablet by mouth Daily., Disp: , Rfl:     BD Pen Needle Armida 2nd Gen 32G X 4 MM misc, USE AS DIRECTED TWICE A DAY WITH INJECTIONS, Disp: , Rfl:     Blood Glucose Monitoring Suppl (Accu-Chek Guide Me) w/Device kit, 1 each by Other route 3 (Three) Times a Day. use to test blood sugar 3 times daily, Disp: , Rfl:     Continuous Glucose  (FreeStyle Carmelo 2 [place]) device, , Disp: , Rfl:     Continuous Glucose Sensor (FreeStyle Carmelo 2 Sensor) misc, , Disp: , Rfl:     Cyanocobalamin ER 1000 MCG tablet controlled-release, Take 1,000 mcg by mouth Every 30 (Thirty) Days., Disp: , Rfl:     ergocalciferol (ERGOCALCIFEROL) 1.25 MG (33033 UT) capsule, ergocalciferol (vitamin D2) 1,250 mcg (50,000 unit) capsule  TAKE 1 CAPSULE BY MOUTH ONE TIME PER WEEK, Disp: , Rfl:     ferrous sulfate 325 (65 FE) MG tablet, Take 1 tablet by mouth Daily With Breakfast., Disp: 30 tablet, Rfl: 0    ferrous sulfate 325 (65 FE) MG tablet, Take 1 tablet by mouth 2 (Two) Times a Day., Disp: , Rfl:     furosemide (LASIX) 40 MG tablet, Take 1 tablet by mouth Every Morning., Disp: , Rfl:     gabapentin (NEURONTIN) 100 MG capsule, Take 1 capsule by mouth 3 (Three) Times a Day., Disp: , Rfl:     glucose blood (OneTouch Verio) test strip, qd Dx code: e11.65, Disp: 100 each, Rfl: 3    hydrOXYzine (ATARAX) 25 MG tablet, TAKE 1 TABLET BY MOUTH THREE TIMES A DAY AS NEEDED FOR 90 DAYS, Disp: , Rfl:     insulin aspart protamine & aspart (NOVOLOG) 70/30 100 unit/mL, Inject 0.24 mL under the skin into the appropriate area as directed., Disp: , Rfl:     Insulin Pen Needle 32G X 4 MM misc, USE AS DIRECTED TWICE DAILY WITH INJECTIONS, Disp: , Rfl:     levothyroxine (SYNTHROID,  "LEVOTHROID) 50 MCG tablet, levothyroxine 50 mcg tablet  TAKE 1 TABLET BY MOUTH EVERY DAY, Disp: , Rfl:     memantine (NAMENDA) 10 MG tablet, Take 1 tablet by mouth 2 (Two) Times a Day., Disp: , Rfl:     metoprolol succinate XL (TOPROL-XL) 50 MG 24 hr tablet, Take 1 tablet by mouth 2 (Two) Times a Day., Disp: 60 tablet, Rfl: 0    ondansetron ODT (ZOFRAN-ODT) 4 MG disintegrating tablet, DISSOLVE 2 TABS ON THE TONGUE EVERY 8 HOURS AS NEEDED, Disp: , Rfl:     OneTouch Delica Lancets 33G misc, testing 1x per day; E11.65, Disp: 100 each, Rfl: 3    pantoprazole (PROTONIX) 40 MG EC tablet, Take 1 tablet by mouth 2 (Two) Times a Day., Disp: 180 tablet, Rfl: 3    potassium chloride (K-DUR,KLOR-CON) 20 MEQ CR tablet, Take 1 tablet by mouth Daily., Disp: 30 tablet, Rfl: 0    potassium chloride ER (K-TAB) 20 MEQ tablet controlled-release ER tablet, Take 1 tablet by mouth Every 12 (Twelve) Hours., Disp: , Rfl:     riFAXIMin (XIFAXAN) 550 MG tablet, Take 1 tablet by mouth 2 (Two) Times a Day., Disp: 60 tablet, Rfl: 11    Xarelto 15 MG tablet, Take 1 tablet by mouth Daily., Disp: , Rfl:     Allergies:   Allergies   Allergen Reactions    Diltiazem Shortness Of Breath    Levofloxacin Anaphylaxis and Other (See Comments)     Couldn't breath    Acetaminophen Other (See Comments)     Feels crazy when taking tylenol    Other reaction(s): Other (See Comments), Other: See Comments   \"feels funny\"   Feels crazy when taking tylenol    Promethazine Other (See Comments)     \"feels funny\"    Promethazine Hcl Mental Status Change    Other Hives and Rash       Social History:   Social History     Socioeconomic History    Marital status:    Tobacco Use    Smoking status: Never    Smokeless tobacco: Never   Vaping Use    Vaping status: Never Used   Substance and Sexual Activity    Alcohol use: No    Drug use: No    Sexual activity: Defer        Surgical History:   Past Surgical History:   Procedure Laterality Date    COLON RESECTION      " [facility]    [facility]  08/08/2019    DR [facility]    COLONOSCOPY N/A 11/1/2022    Procedure: COLONOSCOPY;  Surgeon: Harry Gooden MD;  Location:  [facility];  Service: Gastroenterology;  Laterality: N/A;    ENDOSCOPY N/A 11/1/2022    Procedure: ESOPHAGOGASTRODUODENOSCOPY;  Surgeon: Harry Gooden MD;  Location:  [facility];  Service: Gastroenterology;  Laterality: N/A;    KNEE SURGERY Right     TWICE    UPPER GASTROINTESTINAL ENDOSCOPY          Medical History:   Past Medical History:   Diagnosis Date    Atrial fibrillation     Congenital hyperrotation     Crohn disease     Fatty liver     GERD (gastroesophageal reflux disease)     Hypertension     Hypothyroidism     Neuropathy     Non-alcoholic cirrhosis     Overweight (BMI 25.0-29.9)     Restless leg syndrome     Type 2 diabetes mellitus         Objective     Physical Exam:  Vital Signs: There were no vitals filed for this visit.  Vitals:    01/30/25 0853   BP: 138/96   Pulse: 76   Resp: 24   Temp: 97.8 °F (36.6 °C)   SpO2: 98%         Physical Exam  Vitals and nursing note reviewed.   Constitutional:       General: She is not in acute distress.     Appearance: Normal appearance. She is normal weight. She is not ill-appearing or toxic-appearing.   HENT:      Head: Normocephalic and atraumatic.   Eyes:      General: No scleral icterus.     Extraocular Movements: Extraocular movements intact.      Conjunctiva/sclera: Conjunctivae normal.      Pupils: Pupils are equal, round, and reactive to light.   Cardiovascular:      Rate and Rhythm: Normal rate and regular rhythm.      Pulses: Normal pulses.      Heart sounds: Normal heart sounds.   Pulmonary:      Effort: Pulmonary effort is normal. No respiratory distress.      Breath sounds: Normal breath sounds.   Abdominal:      General: Abdomen is flat. Bowel sounds are normal. There is no distension.      Palpations: Abdomen is soft. There is no mass.  "     Tenderness: There is no abdominal tenderness. There is no guarding or rebound.      Hernia: No hernia is present.   Skin:     General: Skin is warm and dry.      Coloration: Skin is not jaundiced or pale.   Neurological:      Mental Status: She is alert and oriented to person, place, and time. Mental status is at baseline.      Comments: No asterixis on exam   Psychiatric:         Mood and Affect: Mood normal.         Behavior: Behavior normal.         Thought Content: Thought content normal.         Judgment: Judgment normal.     Assessment / Plan      Assessment/Plan:   1.  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  Hepatic encephalopathy, related to above  3.  History of gastrointestinal bleed, chronic iron deficiency anemia  4.  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  Patient is due HCC surveillance; will schedule updated ultrasound and obtain AFP.  Additionally, will obtain updated MELD labs.  General cirrhosis well care reviewed; up-to-date on immunizations and last DEXA without findings of osteoporosis.  Given her chronic anemia, will continue iron supplement and PPI therapy. Will plan EGD later this year.     >>> Orders placed for: CBC, CMP, PT/INR, AFP, Iron and Anemia Levels, vitamin D level  >>> Imaging orders placed for: Liver ultrasound for HCC surveillance  >>> Continue pantoprazole  >>> Continue over-the-counter iron supplementation  >>> Handout entitled \"Cirrhosis: What you need to know\" provided.  Includes:   -Dietary modifications    -Avoid raw shellfish    -Adequate protein intake: 80 g/day    -High-protein bedtime snack    -Frequent protein snacks while awake    -Sodium restricted diet; less than 2 g or 2000 mg/day.    -3 to 5 cups of black coffee daily   -Lifestyle modifications    -No alcohol or tobacco    -No NSAIDs; use Tylenol for mild to moderate pain    -Continue statin    -Follow-up with " gastroenterology team every 6 months     -AFP and liver imaging every 6 months.   -Recommended vaccinations    -Hepatitis A:     -Hepatitis B:     -Influenza    -Pneumococcal pneumonia    -COVID-19   -Osteoporosis    -Discussed that 55% of cirrhotic patients will have osteoporosis.    -Vitamin D level is: pending    -Okay for standard supplementation    -Include daily multivitamin    Follow Up:   Rtc x 3 months.     Plan of care reviewed with the patient at the conclusion of today's visit.  Education was provided regarding diagnosis, management, and any prescribed or recommended OTC medications.  Patient verbalized understanding of and agreement with management plan.     Time Statement:   Discussed plan of care in detail with patient today. Patient verbally understands and agrees. I have spent 46 minutes reviewing available diagnostics, obtaining history, examining the patient, developing a treatment plan, and educating the patient on disease process and plan of care.     Clayton Storey, DNP, APRN, AGACNP-BC.  [facility]

## 2025-01-31 ENCOUNTER — TELEPHONE (OUTPATIENT)
Dept: GASTROENTEROLOGY | Facility: CLINIC | Age: 80
End: 2025-01-31

## 2025-01-31 DIAGNOSIS — N28.9 DECREASED RENAL FUNCTION: ICD-10-CM

## 2025-01-31 DIAGNOSIS — K74.60 LIVER CIRRHOSIS SECONDARY TO NASH: Primary | ICD-10-CM

## 2025-01-31 DIAGNOSIS — R79.89 ELEVATED SERUM CREATININE: ICD-10-CM

## 2025-01-31 DIAGNOSIS — K75.81 LIVER CIRRHOSIS SECONDARY TO NASH: Primary | ICD-10-CM

## 2025-01-31 NOTE — TELEPHONE ENCOUNTER
Hub staff attempted to follow warm transfer process and was unsuccessful     Caller: Yenny Atkins    Relationship to patient: Self    Best call back number:  [phone]    Patient is needing: PT HAD LABS DONE ON 01/30/2025 AND HAS MISSED TWO CALLS FROM THE OFFICE TODAY. PLEASE REACH BACK OUT TO HER.

## 2025-03-29 ENCOUNTER — HOSPITAL ENCOUNTER (OUTPATIENT)
Facility: HOSPITAL | Age: 80
Discharge: HOME | End: 2025-03-29
Payer: MEDICARE

## 2025-03-29 DIAGNOSIS — K31.7 GASTRIC POLYP: ICD-10-CM

## 2025-03-29 DIAGNOSIS — K76.82 HEPATIC ENCEPHALOPATHY: ICD-10-CM

## 2025-03-29 DIAGNOSIS — K75.81 LIVER CIRRHOSIS SECONDARY TO NASH: ICD-10-CM

## 2025-03-29 DIAGNOSIS — K21.9 GASTROESOPHAGEAL REFLUX DISEASE, UNSPECIFIED WHETHER ESOPHAGITIS PRESENT: ICD-10-CM

## 2025-03-29 DIAGNOSIS — K74.60 LIVER CIRRHOSIS SECONDARY TO NASH: ICD-10-CM

## 2025-03-29 PROCEDURE — 76705 ECHO EXAM OF ABDOMEN: CPT

## (undated) DEVICE — INTRO ACCSR BLNT TP

## (undated) DEVICE — GRADUATE CONTN 1000ML

## (undated) DEVICE — KT ORCA ORCAPOD DISP STRL

## (undated) DEVICE — SPNG ENDO BEDSIDE TUB ENZYM

## (undated) DEVICE — SYR LUERLOK 50ML

## (undated) DEVICE — LUBE GEL ENDOGLIDE 1.1OZ

## (undated) DEVICE — SOL IRR H2O BTL 1000ML STRL

## (undated) DEVICE — ENDOGATOR HYBRID TUBING KIT FOR USE WITH ENDOGATOR IRRIGATION PUMP, OLYMPUS PUMP, GI4000 ESU, AND TORRENT IRRIGATION PUMP.: Brand: ENDOGATOR KIT

## (undated) DEVICE — SINGLE-USE BIOPSY FORCEPS: Brand: RADIAL JAW 4

## (undated) DEVICE — CONTN GRAD MEAS TRIANG 32OZ BLK

## (undated) DEVICE — TUBING, SUCTION, 1/4" X 10', STRAIGHT: Brand: MEDLINE

## (undated) DEVICE — THE BITE BLOCK MAXI, LATEX FREE STRAP IS USED TO PROTECT THE ENDOSCOPE INSERTION TUBE FROM BEING BITTEN BY THE PATIENT.

## (undated) DEVICE — HYBRID CO2 TUBING/CAP SET FOR OLYMPUS® SCOPES & CO2 SOURCE: Brand: ERBE